# Patient Record
Sex: MALE | Race: WHITE | Employment: OTHER | URBAN - METROPOLITAN AREA
[De-identification: names, ages, dates, MRNs, and addresses within clinical notes are randomized per-mention and may not be internally consistent; named-entity substitution may affect disease eponyms.]

---

## 2019-10-12 ENCOUNTER — HOSPITAL ENCOUNTER (OUTPATIENT)
Facility: HOSPITAL | Age: 79
Setting detail: OBSERVATION
Discharge: HOME | End: 2019-10-15
Attending: EMERGENCY MEDICINE | Admitting: HOSPITALIST
Payer: MEDICARE

## 2019-10-12 ENCOUNTER — APPOINTMENT (OUTPATIENT)
Dept: ULTRASOUND IMAGING | Facility: HOSPITAL | Age: 79
End: 2019-10-12
Attending: HOSPITALIST
Payer: MEDICARE

## 2019-10-12 ENCOUNTER — APPOINTMENT (OUTPATIENT)
Dept: CT IMAGING | Facility: HOSPITAL | Age: 79
End: 2019-10-12
Attending: EMERGENCY MEDICINE
Payer: MEDICARE

## 2019-10-12 DIAGNOSIS — N39.0 URINARY TRACT INFECTION WITHOUT HEMATURIA, SITE UNSPECIFIED: Primary | ICD-10-CM

## 2019-10-12 PROCEDURE — 70498 CT ANGIOGRAPHY NECK: CPT | Performed by: EMERGENCY MEDICINE

## 2019-10-12 PROCEDURE — 99220 INITIAL OBSERVATION CARE,LEVL III: CPT | Performed by: HOSPITALIST

## 2019-10-12 PROCEDURE — 76770 US EXAM ABDO BACK WALL COMP: CPT | Performed by: HOSPITALIST

## 2019-10-12 PROCEDURE — 70496 CT ANGIOGRAPHY HEAD: CPT | Performed by: EMERGENCY MEDICINE

## 2019-10-12 RX ORDER — MORPHINE SULFATE 4 MG/ML
4 INJECTION, SOLUTION INTRAMUSCULAR; INTRAVENOUS ONCE
Status: COMPLETED | OUTPATIENT
Start: 2019-10-12 | End: 2019-10-12

## 2019-10-12 RX ORDER — PIOGLITAZONEHYDROCHLORIDE 30 MG/1
30 TABLET ORAL DAILY
Status: ON HOLD | COMMUNITY
End: 2019-10-12

## 2019-10-12 RX ORDER — ENOXAPARIN SODIUM 100 MG/ML
40 INJECTION SUBCUTANEOUS DAILY
Status: DISCONTINUED | OUTPATIENT
Start: 2019-10-12 | End: 2019-10-15

## 2019-10-12 RX ORDER — ACETAMINOPHEN 325 MG/1
650 TABLET ORAL EVERY 6 HOURS PRN
Status: DISCONTINUED | OUTPATIENT
Start: 2019-10-12 | End: 2019-10-15

## 2019-10-12 RX ORDER — ATORVASTATIN CALCIUM 40 MG/1
40 TABLET, FILM COATED ORAL DAILY
COMMUNITY

## 2019-10-12 RX ORDER — GEMFIBROZIL 600 MG/1
600 TABLET, FILM COATED ORAL
COMMUNITY
End: 2021-07-07

## 2019-10-12 RX ORDER — PERPHENAZINE/AMITRIPTYLINE HCL 2 MG-25 MG
TABLET ORAL DAILY
Status: ON HOLD | COMMUNITY
End: 2021-07-13

## 2019-10-12 RX ORDER — SODIUM CHLORIDE 9 MG/ML
INJECTION, SOLUTION INTRAVENOUS CONTINUOUS
Status: DISCONTINUED | OUTPATIENT
Start: 2019-10-12 | End: 2019-10-15

## 2019-10-12 RX ORDER — DEXTROSE MONOHYDRATE 25 G/50ML
50 INJECTION, SOLUTION INTRAVENOUS
Status: DISCONTINUED | OUTPATIENT
Start: 2019-10-12 | End: 2019-10-15

## 2019-10-12 RX ORDER — PIOGLITAZONEHYDROCHLORIDE 30 MG/1
30 TABLET ORAL DAILY
Status: ON HOLD | COMMUNITY
End: 2021-07-13

## 2019-10-12 NOTE — ED NOTES
Wife at bedside, states patient has had a recent onset of confusion that began tonight and had difficulty speaking, patient denies falling or hitting of the head.  NIH score 0

## 2019-10-12 NOTE — H&P
[facility]  History and Physical     Bernabe Solitario Patient Status:  Emergency    1940 MRN [id number]   Location [address] [facility] Attending Hilda Sanchez, [address] [place] [address] Se Day # 0 PCP Zulay AU     Chief Comp bilaterally. No rhonchi. Cardiovascular: S1, S2. Regular rhythm. Regular rate. No murmurs, rubs or gallops. Equal pulses. Chest and Back: No tenderness or deformity. Abdomen: Soft, nontender, nondistended. Positive bowel sounds.  No rebound or guarding

## 2019-10-12 NOTE — ED NOTES
21 G IV inserted to the left AC, labs drawn and urine collected, patient appears minimally anxious however cooperative, even bilateral non-labored breathing. Patient taken to CT via cart.

## 2019-10-12 NOTE — ED PROVIDER NOTES
Patient Seen in: [facility] Emergency Department      History   Patient presents with:  Urinary Symptoms (urologic)    Stated Complaint: DIFFICULTY URINATING    HPI    Patient is an 50-year-old male presents to ED for evaluation of urinary symptoms. membranes. Pupils equal round reactive to light accommodation, extraocular motion is intact, sclerae white, conjunctiva is pink. Oropharynx is unremarkable, no exudate. NECK: Supple, trachea midline, no lymphadenopathy.    LUNG: Lungs clear to auscultati -----------         ------                     CBC W/ DIFFERENTIAL[[id number]]          Abnormal            Final result                 Please view results for these tests on the individual orders.    RAINBOW DRAW BLUE   RAINBOW DRAW GOLD   URINE

## 2019-10-12 NOTE — ED NOTES
Patient reports dysuria that began tonight, complains of suprapubic pain, rated 4/10, denies ABD pain, -N/V/D. Patient states he has frequent UTI's and has an appointment with a nephrologist in November.

## 2019-10-12 NOTE — PLAN OF CARE
Temp max today was 103F, pt was diaphoretic and with required O2 2L per NC to maintain sats above 90%. MD notified, Tylenol given, blood cultures sent. Pt feeling much better now, moderate appetite, afebrile. IV fluids infusion in progress.

## 2019-10-12 NOTE — ED NOTES
Home medications charted on, unable to add jardiance 25mg daily, victoza 1.8mg daily, and perphenazine-amitriptyline 2-25mg daily. Recommend CCD 2 and 2 gm Na+ diet when safely advanced to solids  Check Ionized Ca

## 2019-10-12 NOTE — ED NOTES
Patient returned from CT, awaiting results. Patient desaturated to 89%, patient placed on 3L NC. Currently saturating at 96%.

## 2019-10-13 PROCEDURE — 99225 SUBSEQUENT OBSERVATION CARE: CPT | Performed by: HOSPITALIST

## 2019-10-13 NOTE — PROGRESS NOTES
[facility]  Progress Note     Debora Mas Patient Status:  Observation    1940 MRN [id number]   [facility] 5NW-A Attending João Rodriguez MD   Hosp Day # 0 PCP Haim AU     Chief Complaint: AMS, UTI    S: Bethany Perez macrobid  1. Renal US negative for stones but showing enlarged prostate  2. Follow final Urine Cx results  3. Continue empiric Antibiotics  2. DM II- hold oral meds and hyperglycemic protocol   3.  Leukocytosis 2/2 above        Plan of care: await final Cx

## 2019-10-13 NOTE — PLAN OF CARE
Pt AOX4. Impulsive. Glasses. [address] @ home. VSS on RA. No tele. Lovenox SQ. E.p. voids. Urgency. No c.o pain. Up with SBA. Fall precautions in place. Iv abx. Ivf. 1800 ada diet, QID accu checks. Pt and wife updated on poc, wctm.        Problem: Patient/Family Progressing  10/13/2019 1106 by Heraclio Woods RN  Outcome: Progressing

## 2019-10-13 NOTE — PROGRESS NOTES
10/13/19 1143   Vital Signs   Temp (!) 100.6 °F (38.1 °C)   Temp src Oral   Pulse 79   Heart Rate Source Monitor   Resp 18   Respiratory Quality Normal   BP Location Right arm   BP Method Automatic   Patient Position Lying   Oxygen Therapy   SpO2 90 %

## 2019-10-13 NOTE — PLAN OF CARE
Problem: Patient/Family Goals  Goal: Patient/Family Long Term Goal  Description  Patient's Long Term Goal: D/C HOME    Interventions:  - FOLLOW PLAN OF CARE  - See additional Care Plan goals for specific interventions   Outcome: Progressing     Problem:

## 2019-10-14 PROCEDURE — 99225 SUBSEQUENT OBSERVATION CARE: CPT | Performed by: HOSPITALIST

## 2019-10-14 NOTE — CM/SW NOTE
10/14/19 1300   CM/SW Screening   Referral Source    Information Source Chart review;Nursing rounds   Patient's Mental Status Alert;Oriented   Patient's [address]   Number of Levels in Home 1   Patient lives with Spouse   Shital

## 2019-10-14 NOTE — PLAN OF CARE
Assumed pt care this morning. Aa/ox4. Breathing unlabored. Denies pain. Tolerating po intake. Ambulating to bathroom. Awaiting medical clearance for d/c home.

## 2019-10-14 NOTE — PLAN OF CARE
PT A/O, IMPULSIVE, GETS OUT OF BED WITHOUT WAITING FOR STAFF, URGENCY/FREQUENCY, INCONTINENT OF URINE AT TIMES, BED ALARM ON, TEMP 101.8, GIVEN TYLENOL, DESTATS WITH SLEEP TO LOW 80'S, 02 AT 4L FOR NIGHT, IVF INFUSING, IV ANTIBIOTIC.  INSTRUCTED PT TO CALL

## 2019-10-14 NOTE — PROGRESS NOTES
[facility]  Progress Note     Gerri Kathi Patient Status:  Observation    1940 MRN [id number]   [facility] 5NW-A Attending Temo Godinez MD   Hosp Day # 0 PCP Edil AU     Chief Complaint: confusion    S: Patie on iv hydration    Plan of care: abx    Quality:  · DVT Prophylaxis: scd  · CODE status: full  · Ga: no  · Central line: no    Will the patient be referred to TCC on discharge?: no  Estimated date of discharge: am  Discharge is dependent on: progress  A

## 2019-10-15 VITALS
OXYGEN SATURATION: 95 % | BODY MASS INDEX: 28.12 KG/M2 | RESPIRATION RATE: 20 BRPM | WEIGHT: 196.38 LBS | HEIGHT: 70 IN | HEART RATE: 72 BPM | TEMPERATURE: 98 F | SYSTOLIC BLOOD PRESSURE: 123 MMHG | DIASTOLIC BLOOD PRESSURE: 61 MMHG

## 2019-10-15 PROCEDURE — 99217 OBSERVATION CARE DISCHARGE: CPT | Performed by: HOSPITALIST

## 2019-10-15 RX ORDER — CIPROFLOXACIN 500 MG/1
500 TABLET, FILM COATED ORAL 2 TIMES DAILY
Qty: 14 TABLET | Refills: 0 | Status: SHIPPED | OUTPATIENT
Start: 2019-10-15 | End: 2019-10-22

## 2019-10-15 NOTE — PLAN OF CARE
Problem: Patient/Family Goals  Goal: Patient/Family Long Term Goal  Description  Patient's Long Term Goal: D/C HOME    Interventions:  - FOLLOW PLAN OF CARE  - See additional Care Plan goals for specific interventions   Outcome: Progressing  Goal: Pat[place]na

## 2019-10-15 NOTE — DISCHARGE SUMMARY
[facility] [place] [facility]PITAL[facility]  DISCHARGE SUMMARY     Ricci Eaton Patient Status:  Observation    1940 MRN [id number]   [facility] 5NW-A Attending Henry Chery MD   Hosp Day # 0 PCP Jamaal AU     Date of Admission: 10/12/2019  Keven Liraglutide      Inject 1.8 mg into the skin. Refills:  0            [place] Follow-up appointment:   No follow-up provider specified.   Appointments for Next 30 Days 10/15/2019 - 11/14/2019    None          Vital signs:  Temp:  [97.8 °F (36.6

## 2019-10-15 NOTE — PLAN OF CARE
Multidisciplinary Discharge Rounds held 10/15/2019. Treatment team members present today include , , Charge Nurse, Nurse, RT, PT and Pharmacy caring for [employer].      Other care providers present: Jolynn Angeles RN    Mobility Goal:

## 2019-10-15 NOTE — PLAN OF CARE
NURSING DISCHARGE NOTE    Discharged Home via Ambulatory. Accompanied by Spouse and Support staff  Belongings Taken by patient/family. PIV removed. Discharge navigator complete. Discharge instructions reviewed with patient & family at bedside.  All qu

## 2021-01-06 ENCOUNTER — IMPORTED ENCOUNTER (OUTPATIENT)
Dept: URBAN - METROPOLITAN AREA CLINIC 31 | Facility: CLINIC | Age: 81
End: 2021-01-06

## 2021-01-06 PROBLEM — H26.493: Noted: 2021-01-06

## 2021-01-06 PROBLEM — H18.513: Noted: 2021-01-06

## 2021-01-06 PROBLEM — Z96.1: Noted: 2021-01-06

## 2021-01-06 PROBLEM — H40.1422: Noted: 2021-01-06

## 2021-01-06 PROCEDURE — 99204 OFFICE O/P NEW MOD 45 MIN: CPT

## 2021-01-06 NOTE — PATIENT DISCUSSION
1.  1.  Pseudophakia OU - IOLs stable. Monitor for changes in vision. 2. PCO  OU (Posterior Capsule Opacification)   PCO is visually significant and impairment of vision does not meet the patient’s functional needs or interferes with activities of daily living. Risks benefits and alternatives to the Nd:YAG Laser reviewed including elevated IOP immediately postop and retinal tear/detachment. Patient to notify their ophthalmologist promptly if they have a significant change in symptoms such as flashes of light (photopsia) an increase in floaters loss of visual field or decrease in visual acuity after the procedure. Patient will be scheduled in [place] [address] for Nd:YAG Laser. USE LENS for YAG OS to minimize energy. Possibility of IOL dislocation due to PXE discussedPSeudophakodenesisUSE MINIMAL ENERGY FOR OSOD/OS. Dr Call Push to comanage post op3. PXE OS -  Continue with current treatment plan. Discussed importance of compliance. Will continue to monitor for stability or progression. 4. Fuchs Dystrophy OU: Recommend clinical observation. Advised use of Alice 128 drops in affected eye if worsening.

## 2021-01-06 NOTE — PATIENT DISCUSSION
PCO  OU (Posterior Capsule Opacification)   PCO is visually significant and impairment of vision does not meet the patient’s functional needs or interferes with activities of daily living. Risks benefits and alternatives to the Nd:YAG Laser reviewed including elevated IOP immediately postop and retinal tear/detachment. Patient to notify their ophthalmologist promptly if they have a significant change in symptoms such as flashes of light (photopsia) an increase in floaters loss of visual field or decrease in visual acuity after the procedure. Patient will be scheduled in [place] [address] for Nd:YAG Laser.

## 2021-06-01 ENCOUNTER — APPOINTMENT (OUTPATIENT)
Dept: ULTRASOUND IMAGING | Facility: HOSPITAL | Age: 81
End: 2021-06-01
Attending: EMERGENCY MEDICINE
Payer: MEDICARE

## 2021-06-01 ENCOUNTER — HOSPITAL ENCOUNTER (EMERGENCY)
Facility: HOSPITAL | Age: 81
Discharge: HOME | End: 2021-06-01
Attending: EMERGENCY MEDICINE
Payer: MEDICARE

## 2021-06-01 ENCOUNTER — APPOINTMENT (OUTPATIENT)
Dept: GENERAL RADIOLOGY | Facility: HOSPITAL | Age: 81
End: 2021-06-01
Payer: MEDICARE

## 2021-06-01 ENCOUNTER — APPOINTMENT (OUTPATIENT)
Dept: GENERAL RADIOLOGY | Facility: HOSPITAL | Age: 81
End: 2021-06-01
Attending: EMERGENCY MEDICINE
Payer: MEDICARE

## 2021-06-01 VITALS
RESPIRATION RATE: 17 BRPM | SYSTOLIC BLOOD PRESSURE: 120 MMHG | HEART RATE: 93 BPM | OXYGEN SATURATION: 100 % | WEIGHT: 198.44 LBS | TEMPERATURE: 98 F | DIASTOLIC BLOOD PRESSURE: 97 MMHG | BODY MASS INDEX: 28 KG/M2

## 2021-06-01 DIAGNOSIS — S43.015A ANTERIOR DISLOCATION OF LEFT SHOULDER, INITIAL ENCOUNTER: Primary | ICD-10-CM

## 2021-06-01 PROCEDURE — 73030 X-RAY EXAM OF SHOULDER: CPT | Performed by: EMERGENCY MEDICINE

## 2021-06-01 PROCEDURE — 23650 CLTX SHO DSLC W/MNPJ WO ANES: CPT

## 2021-06-01 PROCEDURE — 96374 THER/PROPH/DIAG INJ IV PUSH: CPT

## 2021-06-01 PROCEDURE — 99285 EMERGENCY DEPT VISIT HI MDM: CPT

## 2021-06-01 PROCEDURE — 93005 ELECTROCARDIOGRAM TRACING: CPT

## 2021-06-01 PROCEDURE — 93971 EXTREMITY STUDY: CPT | Performed by: EMERGENCY MEDICINE

## 2021-06-01 PROCEDURE — 96375 TX/PRO/DX INJ NEW DRUG ADDON: CPT

## 2021-06-01 PROCEDURE — 93010 ELECTROCARDIOGRAM REPORT: CPT

## 2021-06-01 RX ORDER — MORPHINE SULFATE 4 MG/ML
2 INJECTION, SOLUTION INTRAMUSCULAR; INTRAVENOUS ONCE
Status: COMPLETED | OUTPATIENT
Start: 2021-06-01 | End: 2021-06-01

## 2021-06-01 NOTE — ED PROVIDER NOTES
Patient Seen in: [facility] Emergency Department      History   Patient presents with:  Arm or Hand Injury    Stated Complaint: Fall few days ago, left shoulder injury and reports not getting any better     HPI/Subjective:   HPI    67-year-old male °C) (Temporal)   Resp 17   Wt 90 kg   SpO2 92%   BMI 28.47 kg/m²         Physical Exam    General:  Vitals as listed. No acute distress   HEENT: Sclerae anicteric. Conjunctivae show no pallor.   Oropharynx clear, mucous membranes moist   Neck: supple, no (MIN 2 VIEWS), LEFT (CPT=73030)     TECHNIQUE:  Multiple views were obtained. COMPARISON:  None.   INDICATIONS:  Fall few days ago, left shoulder injury and reports not getting any better  PATIENT STATED HISTORY: (As transcribed by Technologist)  Patient f secondary to patient factors. Segments of venous anatomy could not be visualized. Of the portions visualized, no DVT demonstrated.     Dictated by (CST): Becka Dominguez MD on 6/01/2021 at 4:39 PM     Finalized by (CST): Becka Dominguez MD on 6/01/2021 at 4:41 PM want them abducting the extremity. I believe that his edema is more dependent edema as this arm is been in a sling and relatively motionless for the past week. He states his pain is minimal currently and he just wants to take OTC Tylenol at home.   He has

## 2021-06-01 NOTE — ED INITIAL ASSESSMENT (HPI)
Per patient, tripped and fell 4 days ago and injured left shoulder. Has not been seen by MD for this. Denies hitting head or loss of consciousness. Only here for left shoulder pain. Patient arrives in sling. AAO x 4. Walks with steady gait and using cane.

## 2021-06-02 ENCOUNTER — APPOINTMENT (OUTPATIENT)
Dept: ULTRASOUND IMAGING | Age: 81
End: 2021-06-02
Attending: EMERGENCY MEDICINE
Payer: MEDICARE

## 2021-06-02 ENCOUNTER — HOSPITAL ENCOUNTER (EMERGENCY)
Age: 81
Discharge: HOME | End: 2021-06-02
Attending: EMERGENCY MEDICINE
Payer: MEDICARE

## 2021-06-02 ENCOUNTER — TELEPHONE (OUTPATIENT)
Dept: CASE MANAGEMENT | Facility: HOSPITAL | Age: 81
End: 2021-06-02

## 2021-06-02 ENCOUNTER — TELEPHONE (OUTPATIENT)
Dept: ORTHOPEDICS CLINIC | Facility: CLINIC | Age: 81
End: 2021-06-02

## 2021-06-02 VITALS
HEIGHT: 70 IN | TEMPERATURE: 98 F | OXYGEN SATURATION: 96 % | WEIGHT: 207 LBS | RESPIRATION RATE: 18 BRPM | BODY MASS INDEX: 29.63 KG/M2 | SYSTOLIC BLOOD PRESSURE: 125 MMHG | DIASTOLIC BLOOD PRESSURE: 77 MMHG | HEART RATE: 94 BPM

## 2021-06-02 DIAGNOSIS — T14.8XXA ABRASION: ICD-10-CM

## 2021-06-02 DIAGNOSIS — R58 ECCHYMOSIS: ICD-10-CM

## 2021-06-02 DIAGNOSIS — R22.9 SOFT TISSUE SWELLING: Primary | ICD-10-CM

## 2021-06-02 PROCEDURE — 93971 EXTREMITY STUDY: CPT | Performed by: EMERGENCY MEDICINE

## 2021-06-02 PROCEDURE — 99284 EMERGENCY DEPT VISIT MOD MDM: CPT

## 2021-06-02 NOTE — TELEPHONE ENCOUNTER
Patient is requesting an appointment with Dr. Jaclyn Bell as soon as possible for a dislocated left shoulder. Should patient be double booked?

## 2021-06-02 NOTE — TELEPHONE ENCOUNTER
Spoke with patient's wife, and notified them that pt can be seen next week, per Dr. Kurtis Frazier. Pt's wife stated they will be out of town Tuesday and Wednesday, and they had concerns because the arm is now having weeping edema from the elbow down.  Dr. Kurtis Frazier not

## 2021-06-02 NOTE — ED PROVIDER NOTES
Patient Seen in: THE [facility] Emergency Department In [place]      History   Patient presents with:  Swelling Edema  Bleeding    Stated Complaint: swelling and bleeding to lt arm after dislocated yesterday    HPI/Subjective:   HPI    Presents with swelling rales/rhonchi. Cardiac: No tachycardia  Abdomen: Soft and nontender throughout. No rebound or guarding  Extremities: There is no dislocation. There is ecchymosis to the upper arm extending past the elbow. It is soft. Strong pulses distally.   Neurovasc

## 2021-06-02 NOTE — TELEPHONE ENCOUNTER
Herve CASTRO  called wanting to know if patient can be seen sooner. She stated Dr. Marcella Tamez was on call and was hoping that he can be seen by him. I let her know that patient called requesting an appointment with Dr. Kurtis Frazier.  Please contact patient

## 2021-06-02 NOTE — ED INITIAL ASSESSMENT (HPI)
Pt was in THE [facility] ED yesterday with shoulder dislocation, pt was in shoulder immobilizer and presents today with swelling and bleeding to the left arm where the immobilizer was placed.

## 2021-06-04 ENCOUNTER — OFFICE VISIT (OUTPATIENT)
Dept: ORTHOPEDICS CLINIC | Facility: CLINIC | Age: 81
End: 2021-06-04
Payer: MEDICARE

## 2021-06-04 DIAGNOSIS — M25.312 INSTABILITY OF LEFT SHOULDER JOINT: Primary | ICD-10-CM

## 2021-06-04 PROCEDURE — 99203 OFFICE O/P NEW LOW 30 MIN: CPT | Performed by: ORTHOPAEDIC SURGERY

## 2021-06-04 PROCEDURE — 23650 CLTX SHO DSLC W/MNPJ WO ANES: CPT | Performed by: ORTHOPAEDIC SURGERY

## 2021-06-08 NOTE — H&P
[facility] - ORTHOPEDICS  [facility] [address] [id number] [phone]     NEW PATIENT VISIT - HISTORY AND PHYSICAL EXAMINATION     Name: Valentina Christensen   MRN: [id number]  Date: 6/4/2021     CC: Left should daily.     • Liraglutide (VICTOZA) 18 MG/3ML Subcutaneous Solution Pen-injector Inject 1.8 mg into the skin.  (Patient not taking: Reported on 6/1/2021 )         ROS: A comprehensive 14 point review of systems was performed and was negative aside from the a shoulder is deferred at this time. Radiographic Examination/Diagnostics:    Radiographs left shoulder personally viewed, independently interpreted and radiology report was reviewed.     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     Dictated by (CST): Hari Joiner MD on 6/01/2021 at 1:17 PM     Finalized by (CST): Hari Joiner MD on 6/01/2021 at 1:18 PM       [facility] (HPW=[id number])    Result Date: 6/2/2021  PROCEDURE:  US VE HISTORY: (As transcribed by Technologist)  Patient offered no additional history at this time     FINDINGS:  The technologist reports a technically limited examination secondary to the patient's inability to move his arm.   The brachial, cephalic and basili the left shoulder. Marylu Wild. Alem Burdick MD  Knee, Shoulder, & Elbow Surgery / Sports Medicine Specialist  [facility]  [facility] [address], [address] [facility]. Sherry Townsend. [email or web address]  t: [phone]  o: [phone]

## 2021-06-11 ENCOUNTER — TELEPHONE (OUTPATIENT)
Dept: ORTHOPEDICS CLINIC | Facility: CLINIC | Age: 81
End: 2021-06-11

## 2021-06-11 NOTE — TELEPHONE ENCOUNTER
Spoke to patients wife who states that patient is in a lot of pain. Unable to sleep at night. Patient was seen last week. Two weeks since injury. Forearm is very sensitive. Swelling has improved. Wife states he is only sleeping 2 hours a day.  Patient woul

## 2021-06-11 NOTE — TELEPHONE ENCOUNTER
Patient spouse is calling on behalf of patient in regards to patient being in a lot of pain due to shoulder dislocation. Patient has been taking tylenol but doesn't seem to help, unable to sleep due to pain.

## 2021-06-12 RX ORDER — TRAMADOL HYDROCHLORIDE 50 MG/1
TABLET ORAL
Qty: 20 TABLET | Refills: 1 | Status: ON HOLD | OUTPATIENT
Start: 2021-06-12 | End: 2021-07-13

## 2021-06-14 NOTE — TELEPHONE ENCOUNTER
Patients wife informed of MD recommendations below, verbalized understanding with intent to comply. Wife states that they were able to  medication over the weekend.

## 2021-06-18 ENCOUNTER — HOSPITAL ENCOUNTER (OUTPATIENT)
Dept: GENERAL RADIOLOGY | Age: 81
Discharge: HOME | End: 2021-06-18
Attending: ORTHOPAEDIC SURGERY
Payer: MEDICARE

## 2021-06-18 ENCOUNTER — OFFICE VISIT (OUTPATIENT)
Dept: ORTHOPEDICS CLINIC | Facility: CLINIC | Age: 81
End: 2021-06-18
Payer: MEDICARE

## 2021-06-18 DIAGNOSIS — M25.312 INSTABILITY OF LEFT SHOULDER JOINT: ICD-10-CM

## 2021-06-18 DIAGNOSIS — M25.312 INSTABILITY OF LEFT SHOULDER JOINT: Primary | ICD-10-CM

## 2021-06-18 PROCEDURE — 99024 POSTOP FOLLOW-UP VISIT: CPT | Performed by: ORTHOPAEDIC SURGERY

## 2021-06-18 PROCEDURE — 73030 X-RAY EXAM OF SHOULDER: CPT | Performed by: ORTHOPAEDIC SURGERY

## 2021-06-20 NOTE — PROGRESS NOTES
[facility]  [address] [place] [phone]       Name: Roel Goodman   MRN: [id number]  Date: 6/18/2021     REASON FOR VISIT: Follow-up evaluation of left shoulder after prolong well as internal rotation. Examination of the left forearm reveals well-healed cutaneous abnormality without of drainage. Overall similar in appearance to the contralateral side. Sensory and motor examination of the hand is within normal limits. (MIN 2 VIEWS), LEFT (CPT=73030), 6/01/2021, 12:59 PM.  INDICATIONS:  Fall few days ago, left shoulder injury and reports not getting any better  PATIENT STATED HISTORY: (As transcribed by Technologist)  left shoulder post reduction.     FINDINGS:  There has TECHNIQUE:  Real time, grey scale, and duplex ultrasound was used to evaluate the upper extremity venous system. B-mode two-dimensional images of the vascular structures, Doppler spectral analysis, and color flow. Doppler imaging were performed.   The foll secondary to patient factors. Segments of venous anatomy could not be visualized. Of the portions visualized, no DVT demonstrated.     Dictated by (CST): Pandora Castleman, MD on 6/01/2021 at 4:39 PM     Finalized by (CST): Pandora Castleman, MD on 6/01/2021 at 4:41 PM

## 2021-06-25 ENCOUNTER — TELEPHONE (OUTPATIENT)
Dept: ORTHOPEDICS CLINIC | Facility: CLINIC | Age: 81
End: 2021-06-25

## 2021-06-25 NOTE — TELEPHONE ENCOUNTER
Patient's wife called requesting PT order be faxed to [facility] at [address] [facility]. Faxed to [phone]. Fax confirmation received.

## 2021-06-30 ENCOUNTER — HOSPITAL ENCOUNTER (OUTPATIENT)
Dept: MRI IMAGING | Facility: HOSPITAL | Age: 81
Discharge: HOME | End: 2021-06-30
Attending: ORTHOPAEDIC SURGERY
Payer: MEDICARE

## 2021-06-30 DIAGNOSIS — M25.312 INSTABILITY OF LEFT SHOULDER JOINT: ICD-10-CM

## 2021-06-30 PROCEDURE — 73221 MRI JOINT UPR EXTREM W/O DYE: CPT | Performed by: ORTHOPAEDIC SURGERY

## 2021-07-02 ENCOUNTER — TELEPHONE (OUTPATIENT)
Dept: ORTHOPEDICS CLINIC | Facility: CLINIC | Age: 81
End: 2021-07-02

## 2021-07-02 ENCOUNTER — OFFICE VISIT (OUTPATIENT)
Dept: ORTHOPEDICS CLINIC | Facility: CLINIC | Age: 81
End: 2021-07-02
Payer: MEDICARE

## 2021-07-02 DIAGNOSIS — M12.812 ROTATOR CUFF TEAR ARTHROPATHY OF LEFT SHOULDER: Primary | ICD-10-CM

## 2021-07-02 DIAGNOSIS — M75.102 ROTATOR CUFF TEAR ARTHROPATHY, LEFT: Primary | ICD-10-CM

## 2021-07-02 DIAGNOSIS — M12.812 ROTATOR CUFF TEAR ARTHROPATHY, LEFT: Primary | ICD-10-CM

## 2021-07-02 DIAGNOSIS — M75.102 ROTATOR CUFF TEAR ARTHROPATHY OF LEFT SHOULDER: Primary | ICD-10-CM

## 2021-07-02 PROCEDURE — 99024 POSTOP FOLLOW-UP VISIT: CPT | Performed by: ORTHOPAEDIC SURGERY

## 2021-07-02 NOTE — TELEPHONE ENCOUNTER
[facility] SHOULDER  Name: Freddy Lynch  MRN: [id number]   : 1940  Diagnosis:  [x]? Rotator cuff tear arthropathy of left shoulder [M75.102, M12.812]  Disposition:    []? Ambulatory  []? Overnight for MORGAN  [x]?  Overnight for observation an

## 2021-07-02 NOTE — H&P (VIEW-ONLY)
[facility] - ORTHOPEDICS  [facility] [address]  [phone]     HISTORY AND PHYSICAL EXAMINATION     Name: Anna Estrada   MRN: [id number]  Date: 7/2/2021     CC: Left shoulder pain and pseudopa illness. SOCIAL HX:  Social History    Tobacco Use      Smoking status: Never Smoker      Smokeless tobacco: Never Used    Alcohol use: Not Currently    His granddaughter is getting  on July 31    PE: There were no vitals filed for this visit. pulse    The contralateral upper extremity is without limitation in range of motion or strength, no positive provocative maneuvers.      Radiographic Examination/Diagnostics:    X-rays and MRI of the left shoulder personally viewed, independently interprete Left shoulder vs gas station door. There is very limited range of motion and mild pain. FINDINGS:  ROTATOR CUFF:  There is a massive rotator cuff tear which involves the entirety of the supraspinatus, infraspinatus and subscapularis tendons.  MUSCULATURE time, grey scale, and duplex ultrasound was used to evaluate the upper extremity venous system. B-mode two-dimensional images of the vascular structures, Doppler spectral analysis, and color flow. Doppler imaging were performed.   The following veins were risks that include, but are not limited to infection, blood loss, potential transient or permanent injury to nerves or blood vessels, joint stiffness, persistent pain, need for future operation, failure of healing, wound complications, failure of therapeut

## 2021-07-02 NOTE — TELEPHONE ENCOUNTER
Surgeon: Dr. Heath Hurtado     Date of Surgery: 7/13/21  Time: to follow     Post Op: 7/20/21     Facility: [facility]     Is this work comp related?  No     Surgical Assistant Obtained: Hernán Moseley Requested      Preadmission Testing Ordered: Yes, MRSA/MSSA To Be Judy Fiore

## 2021-07-02 NOTE — TELEPHONE ENCOUNTER
Vernon Sly's case has been scheduled/rescheduled at [phone] on 7/13/2021 at [facility]  Received:  Today  JANET Hooks  Patient Name: Jones Lovett    MRN: [id number]     LEFT REVERSE SHOULDER ARTHROPLASTY.         Anesth

## 2021-07-02 NOTE — PROGRESS NOTES
[facility] BOOKING SHEET SHOULDER  Name: Bernabe Solitario  MRN: [id number]   : 1940  Diagnosis:  [x] Rotator cuff tear arthropathy of left shoulder [M75.102, M12.812]  Disposition:    [] Ambulatory  [] Overnight for MORGAN  [x] Overnight for observation and pa

## 2021-07-02 NOTE — H&P
[facility] - ORTHOPEDICS  [place] [address]  [phone]     HISTORY AND PHYSICAL EXAMINATION     Name: Barbara Alonzo   MRN: [id number]  Date: 7/2/2021     CC: Left shoulder pain and pseudopa illness. SOCIAL HX:  Social History    Tobacco Use      Smoking status: Never Smoker      Smokeless tobacco: Never Used    Alcohol use: Not Currently    His granddaughter is getting  on July 31    PE: There were no vitals filed for this visit. pulse    The contralateral upper extremity is without limitation in range of motion or strength, no positive provocative maneuvers.      Radiographic Examination/Diagnostics:    X-rays and MRI of the left shoulder personally viewed, independently interprete Left shoulder vs gas station door. There is very limited range of motion and mild pain. FINDINGS:  ROTATOR CUFF:  There is a massive rotator cuff tear which involves the entirety of the supraspinatus, infraspinatus and subscapularis tendons.  MUSCULATURE time, grey scale, and duplex ultrasound was used to evaluate the upper extremity venous system. B-mode two-dimensional images of the vascular structures, Doppler spectral analysis, and color flow. Doppler imaging were performed.   The following veins were risks that include, but are not limited to infection, blood loss, potential transient or permanent injury to nerves or blood vessels, joint stiffness, persistent pain, need for future operation, failure of healing, wound complications, failure of therapeut

## 2021-07-06 ENCOUNTER — LABORATORY ENCOUNTER (OUTPATIENT)
Dept: LAB | Age: 81
End: 2021-07-06
Payer: MEDICARE

## 2021-07-06 DIAGNOSIS — M12.812 ROTATOR CUFF TEAR ARTHROPATHY, LEFT: ICD-10-CM

## 2021-07-06 DIAGNOSIS — M75.102 ROTATOR CUFF TEAR ARTHROPATHY, LEFT: ICD-10-CM

## 2021-07-06 LAB
ANION GAP SERPL CALC-SCNC: 2 MMOL/L (ref 0–18)
ANTIBODY SCREEN: NEGATIVE
BUN BLD-MCNC: 21 MG/DL (ref 7–18)
BUN/CREAT SERPL: 26.9 (ref 10–20)
CALCIUM BLD-MCNC: 9.2 MG/DL (ref 8.5–10.1)
CHLORIDE SERPL-SCNC: 105 MMOL/L (ref 98–112)
CO2 SERPL-SCNC: 29 MMOL/L (ref 21–32)
CREAT BLD-MCNC: 0.78 MG/DL
DEPRECATED RDW RBC AUTO: 49.4 FL (ref 35.1–46.3)
ERYTHROCYTE [DISTWIDTH] IN BLOOD BY AUTOMATED COUNT: 14.1 % (ref 11–15)
GLUCOSE BLD-MCNC: 170 MG/DL (ref 70–99)
HCT VFR BLD AUTO: 47.6 %
HGB BLD-MCNC: 14.7 G/DL
MCH RBC QN AUTO: 29.9 PG (ref 26–34)
MCHC RBC AUTO-ENTMCNC: 30.9 G/DL (ref 31–37)
MCV RBC AUTO: 96.7 FL
OSMOLALITY SERPL CALC.SUM OF ELEC: 289 MOSM/KG (ref 275–295)
PLATELET # BLD AUTO: 216 10(3)UL (ref 150–450)
POTASSIUM SERPL-SCNC: 4.7 MMOL/L (ref 3.5–5.1)
RBC # BLD AUTO: 4.92 X10(6)UL
RH BLOOD TYPE: POSITIVE
SODIUM SERPL-SCNC: 136 MMOL/L (ref 136–145)
WBC # BLD AUTO: 5.6 X10(3) UL (ref 4–11)

## 2021-07-06 PROCEDURE — 87081 CULTURE SCREEN ONLY: CPT

## 2021-07-06 PROCEDURE — 36415 COLL VENOUS BLD VENIPUNCTURE: CPT

## 2021-07-06 PROCEDURE — 86900 BLOOD TYPING SEROLOGIC ABO: CPT

## 2021-07-06 PROCEDURE — 86850 RBC ANTIBODY SCREEN: CPT

## 2021-07-06 PROCEDURE — 80048 BASIC METABOLIC PNL TOTAL CA: CPT

## 2021-07-06 PROCEDURE — 85027 COMPLETE CBC AUTOMATED: CPT

## 2021-07-06 PROCEDURE — 86901 BLOOD TYPING SEROLOGIC RH(D): CPT

## 2021-07-07 ENCOUNTER — HOSPITAL ENCOUNTER (OUTPATIENT)
Dept: CT IMAGING | Age: 81
Discharge: HOME | End: 2021-07-07
Attending: ORTHOPAEDIC SURGERY
Payer: MEDICARE

## 2021-07-07 DIAGNOSIS — M75.102 ROTATOR CUFF TEAR ARTHROPATHY OF LEFT SHOULDER: ICD-10-CM

## 2021-07-07 DIAGNOSIS — M12.812 ROTATOR CUFF TEAR ARTHROPATHY OF LEFT SHOULDER: ICD-10-CM

## 2021-07-07 PROCEDURE — 73200 CT UPPER EXTREMITY W/O DYE: CPT | Performed by: ORTHOPAEDIC SURGERY

## 2021-07-07 RX ORDER — ACETAMINOPHEN 500 MG
1000 TABLET ORAL ONCE
Status: CANCELLED | OUTPATIENT
Start: 2021-07-07 | End: 2021-07-07

## 2021-07-09 ENCOUNTER — TELEPHONE (OUTPATIENT)
Dept: ORTHOPEDICS CLINIC | Facility: CLINIC | Age: 81
End: 2021-07-09

## 2021-07-09 NOTE — TELEPHONE ENCOUNTER
Spoke with patient's wife and notified her that outpatient therapy would be appropriate after her 's surgery, and it can start post op day #1. No further questions at this time.

## 2021-07-09 NOTE — TELEPHONE ENCOUNTER
Patient's spouse would like to speak to a nurse or Dr. Kaylen Leos in regards to having questions about upcoming surgery.

## 2021-07-09 NOTE — TELEPHONE ENCOUNTER
Attempted to call Kayden Phillips wife regarding questions before surgery. Reached voicemail, left voicemail and provided a detail message with my call back contact information.

## 2021-07-09 NOTE — TELEPHONE ENCOUNTER
Spoke with patient's wife who was asking whether PT would be prescribed for her  after his upcoming surgery, and if it would be home or outpatient therapy.  She wanted to let Dr. Mills Daily know that they have a therapy location in mind, [facility] Therapy i

## 2021-07-10 ENCOUNTER — LAB ENCOUNTER (OUTPATIENT)
Dept: LAB | Age: 81
DRG: 483 | End: 2021-07-10
Attending: ORTHOPAEDIC SURGERY
Payer: MEDICARE

## 2021-07-10 DIAGNOSIS — M12.812 ROTATOR CUFF TEAR ARTHROPATHY, LEFT: ICD-10-CM

## 2021-07-10 DIAGNOSIS — M75.102 ROTATOR CUFF TEAR ARTHROPATHY, LEFT: ICD-10-CM

## 2021-07-11 LAB — SARS-COV-2 RNA RESP QL NAA+PROBE: NOT DETECTED

## 2021-07-13 ENCOUNTER — ANESTHESIA EVENT (OUTPATIENT)
Dept: SURGERY | Facility: HOSPITAL | Age: 81
DRG: 483 | End: 2021-07-13
Payer: MEDICARE

## 2021-07-13 ENCOUNTER — APPOINTMENT (OUTPATIENT)
Dept: GENERAL RADIOLOGY | Facility: HOSPITAL | Age: 81
DRG: 483 | End: 2021-07-13
Attending: ORTHOPAEDIC SURGERY
Payer: MEDICARE

## 2021-07-13 ENCOUNTER — HOSPITAL ENCOUNTER (INPATIENT)
Facility: HOSPITAL | Age: 81
LOS: 2 days | Discharge: HOME HEALTH | DRG: 483 | End: 2021-07-15
Attending: ORTHOPAEDIC SURGERY | Admitting: ORTHOPAEDIC SURGERY
Payer: MEDICARE

## 2021-07-13 ENCOUNTER — ANESTHESIA (OUTPATIENT)
Dept: SURGERY | Facility: HOSPITAL | Age: 81
DRG: 483 | End: 2021-07-13
Payer: MEDICARE

## 2021-07-13 DIAGNOSIS — M75.102 ROTATOR CUFF TEAR ARTHROPATHY, LEFT: Primary | ICD-10-CM

## 2021-07-13 DIAGNOSIS — M12.812 ROTATOR CUFF TEAR ARTHROPATHY, LEFT: Primary | ICD-10-CM

## 2021-07-13 LAB
GLUCOSE BLD-MCNC: 129 MG/DL (ref 70–99)
GLUCOSE BLD-MCNC: 193 MG/DL (ref 70–99)
GLUCOSE BLD-MCNC: 204 MG/DL (ref 70–99)
GLUCOSE BLD-MCNC: 217 MG/DL (ref 70–99)

## 2021-07-13 PROCEDURE — 99223 1ST HOSP IP/OBS HIGH 75: CPT | Performed by: HOSPITALIST

## 2021-07-13 PROCEDURE — 0RRK00Z REPLACEMENT OF LEFT SHOULDER JOINT WITH REVERSE BALL AND SOCKET SYNTHETIC SUBSTITUTE, OPEN APPROACH: ICD-10-PCS | Performed by: ORTHOPAEDIC SURGERY

## 2021-07-13 PROCEDURE — 73020 X-RAY EXAM OF SHOULDER: CPT | Performed by: ORTHOPAEDIC SURGERY

## 2021-07-13 DEVICE — IMPLANTABLE DEVICE
Type: IMPLANTABLE DEVICE | Site: SHOULDER | Status: FUNCTIONAL
Brand: COMPREHENSIVE SHOULDER SYSTEM

## 2021-07-13 DEVICE — IMPLANTABLE DEVICE
Type: IMPLANTABLE DEVICE | Site: SHOULDER | Status: FUNCTIONAL
Brand: COMPREHENSIVE® REVERSE SHOULDER

## 2021-07-13 DEVICE — IMPLANTABLE DEVICE
Type: IMPLANTABLE DEVICE | Site: SHOULDER | Status: FUNCTIONAL
Brand: COMPREHENSIVE REVERSE SHOULDER

## 2021-07-13 RX ORDER — SODIUM CHLORIDE, SODIUM LACTATE, POTASSIUM CHLORIDE, CALCIUM CHLORIDE 600; 310; 30; 20 MG/100ML; MG/100ML; MG/100ML; MG/100ML
INJECTION, SOLUTION INTRAVENOUS CONTINUOUS
Status: DISCONTINUED | OUTPATIENT
Start: 2021-07-13 | End: 2021-07-13 | Stop reason: HOSPADM

## 2021-07-13 RX ORDER — PIOGLITAZONE HCL 30 MG
30 TABLET ORAL DAILY
COMMUNITY

## 2021-07-13 RX ORDER — CEFAZOLIN SODIUM/WATER 2 G/20 ML
2 SYRINGE (ML) INTRAVENOUS EVERY 8 HOURS
Status: COMPLETED | OUTPATIENT
Start: 2021-07-13 | End: 2021-07-14

## 2021-07-13 RX ORDER — OXYCODONE HYDROCHLORIDE 5 MG/1
5 TABLET ORAL EVERY 4 HOURS PRN
Status: DISCONTINUED | OUTPATIENT
Start: 2021-07-13 | End: 2021-07-14

## 2021-07-13 RX ORDER — DEXTROSE MONOHYDRATE 25 G/50ML
50 INJECTION, SOLUTION INTRAVENOUS
Status: DISCONTINUED | OUTPATIENT
Start: 2021-07-13 | End: 2021-07-15

## 2021-07-13 RX ORDER — ATORVASTATIN CALCIUM 40 MG/1
40 TABLET, FILM COATED ORAL DAILY
Status: DISCONTINUED | OUTPATIENT
Start: 2021-07-14 | End: 2021-07-15

## 2021-07-13 RX ORDER — DIPHENHYDRAMINE HYDROCHLORIDE 50 MG/ML
25 INJECTION INTRAMUSCULAR; INTRAVENOUS ONCE AS NEEDED
Status: ACTIVE | OUTPATIENT
Start: 2021-07-13 | End: 2021-07-13

## 2021-07-13 RX ORDER — KETOROLAC TROMETHAMINE 15 MG/ML
15 INJECTION, SOLUTION INTRAMUSCULAR; INTRAVENOUS EVERY 6 HOURS
Status: DISPENSED | OUTPATIENT
Start: 2021-07-13 | End: 2021-07-14

## 2021-07-13 RX ORDER — POLYMYXIN B 500000 [USP'U]/1
INJECTION, POWDER, LYOPHILIZED, FOR SOLUTION INTRAMUSCULAR; INTRATHECAL; INTRAVENOUS; OPHTHALMIC AS NEEDED
Status: DISCONTINUED | OUTPATIENT
Start: 2021-07-13 | End: 2021-07-13 | Stop reason: HOSPADM

## 2021-07-13 RX ORDER — BISACODYL 10 MG
10 SUPPOSITORY, RECTAL RECTAL
Status: DISCONTINUED | OUTPATIENT
Start: 2021-07-13 | End: 2021-07-15

## 2021-07-13 RX ORDER — POLYETHYLENE GLYCOL 3350 17 G/17G
17 POWDER, FOR SOLUTION ORAL DAILY PRN
Status: DISCONTINUED | OUTPATIENT
Start: 2021-07-13 | End: 2021-07-15

## 2021-07-13 RX ORDER — ONDANSETRON 2 MG/ML
4 INJECTION INTRAMUSCULAR; INTRAVENOUS EVERY 4 HOURS PRN
Status: DISCONTINUED | OUTPATIENT
Start: 2021-07-13 | End: 2021-07-15

## 2021-07-13 RX ORDER — INSULIN ASPART 100 [IU]/ML
INJECTION, SOLUTION INTRAVENOUS; SUBCUTANEOUS
Status: COMPLETED
Start: 2021-07-13 | End: 2021-07-13

## 2021-07-13 RX ORDER — METOCLOPRAMIDE HYDROCHLORIDE 5 MG/ML
INJECTION INTRAMUSCULAR; INTRAVENOUS AS NEEDED
Status: DISCONTINUED | OUTPATIENT
Start: 2021-07-13 | End: 2021-07-13 | Stop reason: SURG

## 2021-07-13 RX ORDER — ROCURONIUM BROMIDE 10 MG/ML
INJECTION, SOLUTION INTRAVENOUS AS NEEDED
Status: DISCONTINUED | OUTPATIENT
Start: 2021-07-13 | End: 2021-07-13 | Stop reason: SURG

## 2021-07-13 RX ORDER — HYDROMORPHONE HYDROCHLORIDE 1 MG/ML
INJECTION, SOLUTION INTRAMUSCULAR; INTRAVENOUS; SUBCUTANEOUS
Status: DISCONTINUED
Start: 2021-07-13 | End: 2021-07-13 | Stop reason: WASHOUT

## 2021-07-13 RX ORDER — NALOXONE HYDROCHLORIDE 0.4 MG/ML
80 INJECTION, SOLUTION INTRAMUSCULAR; INTRAVENOUS; SUBCUTANEOUS AS NEEDED
Status: DISCONTINUED | OUTPATIENT
Start: 2021-07-13 | End: 2021-07-13 | Stop reason: HOSPADM

## 2021-07-13 RX ORDER — MELATONIN
3 NIGHTLY PRN
Status: DISCONTINUED | OUTPATIENT
Start: 2021-07-13 | End: 2021-07-15

## 2021-07-13 RX ORDER — ONDANSETRON 2 MG/ML
INJECTION INTRAMUSCULAR; INTRAVENOUS AS NEEDED
Status: DISCONTINUED | OUTPATIENT
Start: 2021-07-13 | End: 2021-07-13 | Stop reason: SURG

## 2021-07-13 RX ORDER — CEFAZOLIN SODIUM/WATER 2 G/20 ML
2 SYRINGE (ML) INTRAVENOUS ONCE
Status: COMPLETED | OUTPATIENT
Start: 2021-07-13 | End: 2021-07-13

## 2021-07-13 RX ORDER — LIDOCAINE HYDROCHLORIDE AND EPINEPHRINE 10; 10 MG/ML; UG/ML
INJECTION, SOLUTION INFILTRATION; PERINEURAL AS NEEDED
Status: DISCONTINUED | OUTPATIENT
Start: 2021-07-13 | End: 2021-07-13 | Stop reason: HOSPADM

## 2021-07-13 RX ORDER — HYDROMORPHONE HYDROCHLORIDE 1 MG/ML
0.4 INJECTION, SOLUTION INTRAMUSCULAR; INTRAVENOUS; SUBCUTANEOUS EVERY 5 MIN PRN
Status: DISCONTINUED | OUTPATIENT
Start: 2021-07-13 | End: 2021-07-13 | Stop reason: HOSPADM

## 2021-07-13 RX ORDER — TRAMADOL HYDROCHLORIDE 50 MG/1
50 TABLET ORAL EVERY 6 HOURS
Status: DISCONTINUED | OUTPATIENT
Start: 2021-07-13 | End: 2021-07-14

## 2021-07-13 RX ORDER — SODIUM PHOSPHATE, DIBASIC AND SODIUM PHOSPHATE, MONOBASIC 7; 19 G/133ML; G/133ML
1 ENEMA RECTAL ONCE AS NEEDED
Status: DISCONTINUED | OUTPATIENT
Start: 2021-07-13 | End: 2021-07-15

## 2021-07-13 RX ORDER — DEXAMETHASONE SODIUM PHOSPHATE 10 MG/ML
8 INJECTION, SOLUTION INTRAMUSCULAR; INTRAVENOUS ONCE
Status: DISCONTINUED | OUTPATIENT
Start: 2021-07-14 | End: 2021-07-14

## 2021-07-13 RX ORDER — PHENYLEPHRINE HCL 10 MG/ML
VIAL (ML) INJECTION AS NEEDED
Status: DISCONTINUED | OUTPATIENT
Start: 2021-07-13 | End: 2021-07-13 | Stop reason: SURG

## 2021-07-13 RX ORDER — HYDROMORPHONE HYDROCHLORIDE 1 MG/ML
0.2 INJECTION, SOLUTION INTRAMUSCULAR; INTRAVENOUS; SUBCUTANEOUS EVERY 2 HOUR PRN
Status: DISCONTINUED | OUTPATIENT
Start: 2021-07-13 | End: 2021-07-14

## 2021-07-13 RX ORDER — HYDROCODONE BITARTRATE AND ACETAMINOPHEN 5; 325 MG/1; MG/1
1 TABLET ORAL AS NEEDED
Status: DISCONTINUED | OUTPATIENT
Start: 2021-07-13 | End: 2021-07-13 | Stop reason: HOSPADM

## 2021-07-13 RX ORDER — SODIUM CHLORIDE, SODIUM LACTATE, POTASSIUM CHLORIDE, CALCIUM CHLORIDE 600; 310; 30; 20 MG/100ML; MG/100ML; MG/100ML; MG/100ML
INJECTION, SOLUTION INTRAVENOUS CONTINUOUS
Status: DISCONTINUED | OUTPATIENT
Start: 2021-07-13 | End: 2021-07-13

## 2021-07-13 RX ORDER — ONDANSETRON 2 MG/ML
4 INJECTION INTRAMUSCULAR; INTRAVENOUS AS NEEDED
Status: DISCONTINUED | OUTPATIENT
Start: 2021-07-13 | End: 2021-07-13 | Stop reason: HOSPADM

## 2021-07-13 RX ORDER — AMITRIPTYLINE HYDROCHLORIDE 50 MG/1
50 TABLET, FILM COATED ORAL NIGHTLY
COMMUNITY

## 2021-07-13 RX ORDER — GLYCOPYRROLATE 0.2 MG/ML
INJECTION, SOLUTION INTRAMUSCULAR; INTRAVENOUS AS NEEDED
Status: DISCONTINUED | OUTPATIENT
Start: 2021-07-13 | End: 2021-07-13 | Stop reason: SURG

## 2021-07-13 RX ORDER — BUPIVACAINE HYDROCHLORIDE AND EPINEPHRINE 5; 5 MG/ML; UG/ML
INJECTION, SOLUTION EPIDURAL; INTRACAUDAL; PERINEURAL AS NEEDED
Status: DISCONTINUED | OUTPATIENT
Start: 2021-07-13 | End: 2021-07-13 | Stop reason: HOSPADM

## 2021-07-13 RX ORDER — ASPIRIN 81 MG/1
81 TABLET ORAL 2 TIMES DAILY
Status: DISCONTINUED | OUTPATIENT
Start: 2021-07-13 | End: 2021-07-15

## 2021-07-13 RX ORDER — HYDROCODONE BITARTRATE AND ACETAMINOPHEN 5; 325 MG/1; MG/1
2 TABLET ORAL AS NEEDED
Status: DISCONTINUED | OUTPATIENT
Start: 2021-07-13 | End: 2021-07-13 | Stop reason: HOSPADM

## 2021-07-13 RX ORDER — OXYCODONE HYDROCHLORIDE 5 MG/1
5 TABLET ORAL
Qty: 25 TABLET | Refills: 0 | Status: SHIPPED | OUTPATIENT
Start: 2021-07-13 | End: 2021-07-15

## 2021-07-13 RX ORDER — ONDANSETRON 4 MG/1
4 TABLET, ORALLY DISINTEGRATING ORAL EVERY 8 HOURS PRN
Qty: 8 TABLET | Refills: 0 | Status: SHIPPED | OUTPATIENT
Start: 2021-07-13 | End: 2021-09-02

## 2021-07-13 RX ORDER — LIDOCAINE HYDROCHLORIDE 10 MG/ML
INJECTION, SOLUTION EPIDURAL; INFILTRATION; INTRACAUDAL; PERINEURAL AS NEEDED
Status: DISCONTINUED | OUTPATIENT
Start: 2021-07-13 | End: 2021-07-13 | Stop reason: SURG

## 2021-07-13 RX ORDER — HYDROMORPHONE HYDROCHLORIDE 1 MG/ML
0.4 INJECTION, SOLUTION INTRAMUSCULAR; INTRAVENOUS; SUBCUTANEOUS EVERY 2 HOUR PRN
Status: DISCONTINUED | OUTPATIENT
Start: 2021-07-13 | End: 2021-07-14

## 2021-07-13 RX ORDER — TIMOLOL MALEATE 5 MG/ML
1 SOLUTION/ DROPS OPHTHALMIC 2 TIMES DAILY
COMMUNITY

## 2021-07-13 RX ORDER — ACETAMINOPHEN 325 MG/1
650 TABLET ORAL 4 TIMES DAILY
Status: DISCONTINUED | OUTPATIENT
Start: 2021-07-13 | End: 2021-07-15

## 2021-07-13 RX ORDER — TRANEXAMIC ACID 10 MG/ML
1000 INJECTION, SOLUTION INTRAVENOUS ONCE
Status: COMPLETED | OUTPATIENT
Start: 2021-07-13 | End: 2021-07-13

## 2021-07-13 RX ORDER — ACETAMINOPHEN 500 MG
1000 TABLET ORAL ONCE
COMMUNITY

## 2021-07-13 RX ORDER — TIMOLOL MALEATE 5 MG/ML
1 SOLUTION/ DROPS OPHTHALMIC 2 TIMES DAILY
Status: DISCONTINUED | OUTPATIENT
Start: 2021-07-13 | End: 2021-07-15

## 2021-07-13 RX ORDER — DEXTROSE MONOHYDRATE 25 G/50ML
50 INJECTION, SOLUTION INTRAVENOUS
Status: DISCONTINUED | OUTPATIENT
Start: 2021-07-13 | End: 2021-07-13 | Stop reason: HOSPADM

## 2021-07-13 RX ORDER — NEOSTIGMINE METHYLSULFATE 1 MG/ML
INJECTION INTRAVENOUS AS NEEDED
Status: DISCONTINUED | OUTPATIENT
Start: 2021-07-13 | End: 2021-07-13 | Stop reason: SURG

## 2021-07-13 RX ORDER — AMITRIPTYLINE HYDROCHLORIDE 50 MG/1
50 TABLET, FILM COATED ORAL NIGHTLY
Status: DISCONTINUED | OUTPATIENT
Start: 2021-07-13 | End: 2021-07-13

## 2021-07-13 RX ORDER — HYDROMORPHONE HYDROCHLORIDE 1 MG/ML
INJECTION, SOLUTION INTRAMUSCULAR; INTRAVENOUS; SUBCUTANEOUS
Status: COMPLETED
Start: 2021-07-13 | End: 2021-07-13

## 2021-07-13 RX ORDER — INSULIN ASPART 100 [IU]/ML
5 INJECTION, SOLUTION INTRAVENOUS; SUBCUTANEOUS ONCE
Status: COMPLETED | OUTPATIENT
Start: 2021-07-13 | End: 2021-07-13

## 2021-07-13 RX ORDER — SODIUM CHLORIDE 9 MG/ML
INJECTION, SOLUTION INTRAVENOUS CONTINUOUS
Status: DISCONTINUED | OUTPATIENT
Start: 2021-07-13 | End: 2021-07-14

## 2021-07-13 RX ORDER — ACETAMINOPHEN 325 MG/1
650 TABLET ORAL ONCE
Status: DISCONTINUED | OUTPATIENT
Start: 2021-07-13 | End: 2021-07-13 | Stop reason: HOSPADM

## 2021-07-13 RX ORDER — HYDROCODONE BITARTRATE AND ACETAMINOPHEN 5; 325 MG/1; MG/1
1 TABLET ORAL EVERY 4 HOURS PRN
Status: DISCONTINUED | OUTPATIENT
Start: 2021-07-13 | End: 2021-07-14

## 2021-07-13 RX ORDER — HYDROCODONE BITARTRATE AND ACETAMINOPHEN 5; 325 MG/1; MG/1
2 TABLET ORAL EVERY 4 HOURS PRN
Status: DISCONTINUED | OUTPATIENT
Start: 2021-07-13 | End: 2021-07-14

## 2021-07-13 RX ORDER — DOCUSATE SODIUM 100 MG/1
100 CAPSULE, LIQUID FILLED ORAL 2 TIMES DAILY
Status: DISCONTINUED | OUTPATIENT
Start: 2021-07-13 | End: 2021-07-15

## 2021-07-13 RX ORDER — OXYCODONE HYDROCHLORIDE 5 MG/1
2.5 TABLET ORAL EVERY 4 HOURS PRN
Status: DISCONTINUED | OUTPATIENT
Start: 2021-07-13 | End: 2021-07-14

## 2021-07-13 RX ADMIN — METOCLOPRAMIDE HYDROCHLORIDE 10 MG: 5 INJECTION INTRAMUSCULAR; INTRAVENOUS at 12:12:00

## 2021-07-13 RX ADMIN — ROCURONIUM BROMIDE 20 MG: 10 INJECTION, SOLUTION INTRAVENOUS at 11:30:00

## 2021-07-13 RX ADMIN — ROCURONIUM BROMIDE 45 MG: 10 INJECTION, SOLUTION INTRAVENOUS at 10:17:00

## 2021-07-13 RX ADMIN — PHENYLEPHRINE HCL 100 MCG: 10 MG/ML VIAL (ML) INJECTION at 10:38:00

## 2021-07-13 RX ADMIN — SODIUM CHLORIDE, SODIUM LACTATE, POTASSIUM CHLORIDE, CALCIUM CHLORIDE: 600; 310; 30; 20 INJECTION, SOLUTION INTRAVENOUS at 12:52:00

## 2021-07-13 RX ADMIN — LIDOCAINE HYDROCHLORIDE 50 MG: 10 INJECTION, SOLUTION EPIDURAL; INFILTRATION; INTRACAUDAL; PERINEURAL at 10:17:00

## 2021-07-13 RX ADMIN — TRANEXAMIC ACID 1000 MG: 10 INJECTION, SOLUTION INTRAVENOUS at 10:33:00

## 2021-07-13 RX ADMIN — PHENYLEPHRINE HCL 100 MCG: 10 MG/ML VIAL (ML) INJECTION at 10:33:00

## 2021-07-13 RX ADMIN — NEOSTIGMINE METHYLSULFATE 5 MG: 1 INJECTION INTRAVENOUS at 12:29:00

## 2021-07-13 RX ADMIN — GLYCOPYRROLATE 0.5 MG: 0.2 INJECTION, SOLUTION INTRAMUSCULAR; INTRAVENOUS at 12:29:00

## 2021-07-13 RX ADMIN — PHENYLEPHRINE HCL 100 MCG: 10 MG/ML VIAL (ML) INJECTION at 10:17:00

## 2021-07-13 RX ADMIN — CEFAZOLIN SODIUM/WATER 2 G: 2 G/20 ML SYRINGE (ML) INTRAVENOUS at 10:30:00

## 2021-07-13 RX ADMIN — PHENYLEPHRINE HCL 100 MCG: 10 MG/ML VIAL (ML) INJECTION at 10:23:00

## 2021-07-13 RX ADMIN — ONDANSETRON 4 MG: 2 INJECTION INTRAMUSCULAR; INTRAVENOUS at 12:12:00

## 2021-07-13 NOTE — OPERATIVE REPORT
[facility] OPERATIVE RECORD  ATTENDING SURGEON: Aida Perez MD  ASSISTANT(S): Moisés Earl [place], [place] [facility]  PRELIMINARY DIAGNOSIS:  1. Left Shoulder Rotator Cuff Tear Arthropathy  2.    Left Biceps Tendinitis     POSTOPE Retracted supraspinatus tear and mild teres intact but infraspinatus torn.   Glenoid: Walch 1  [place]: Type 1  Bone grafting: None  Biceps: tenodesis to subscapularis  Indications:  Melani Rene is a 79year old male with Right hand dominant male left shoulder ma throughout the case.      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 A 45 degree neck-shaft angle cut was made in approximately 25 degrees of retroversion using an oscillating saw with appropriate retractors for soft tissue protection. The intramedullary guide was utilized.  Humer was seated into position with offset anteroinferior and malleted into place to secure the [facility] CARE [facility]. Attention was turned back to the humerus.  Three drill holes were placed in the proximal humerus and Suture tape sutures were passed in looped config Precautions: 2 lb WB, ROMAT  2. DVT Prophylaxis: ASA 81 mg BID  3. Follow-up Visit: 2 weeks post surgery. Rylan Ruiz.  Mame James MD  Knee, Shoulder, & Elbow Surgery / Sports Medicine Specialist  [facility]  [facility] [address], [address], [place]

## 2021-07-13 NOTE — INTERVAL H&P NOTE
Pre-op Diagnosis: Rotator cuff tear arthropathy, left [M75.102, M12.812]    The above referenced H&P was reviewed by Jermaine Conde MD on 7/13/2021, the patient was examined and no significant changes have occurred in the patient's condition since the

## 2021-07-13 NOTE — PLAN OF CARE
NURSING ADMISSION NOTE      Patient admitted via  bed from PACU. Oriented to room. Safety precautions initiated. Bed in low position. Call light in reach. Dr. Graham Hopper notified of consult.  Left shoulder dressing dry and intact, radial pulse strong,

## 2021-07-13 NOTE — CONSULTS
[facility]  [address] [place] [address] Patient Status:  Inpatient    1940 MRN [id number]   [facility] 3SW-A Attending Enriqueta Camacho MD   Hosp Day # 0 PCP Vivienne Chahal, [facility] [address]     Reason for consult: Diabetes melli Disp: , Rfl:   Multiple Vitamins-Minerals (PRESERVISION AREDS 2 OR), Take 1 tablet by mouth daily. , Disp: , Rfl:   atorvastatin 40 MG Oral Tab, Take 40 mg by mouth daily. , Disp: , Rfl:   metFORMIN HCl 500 MG Oral Tab, Take 1,000 mg by mouth 2 (two) times last 168 hours. Inflammatory Markers  No results for input(s): CRP, DANG, LDH, DDIMER in the last 168 hours. Imaging: Imaging data reviewed in Epic. ASSESSMENT / PLAN:     1.  Left rotator cuff tear, arthropathy sp left reverse shoulder arthroplas

## 2021-07-13 NOTE — ANESTHESIA PREPROCEDURE EVALUATION
PRE-OP EVALUATION    Patient Name: Mae Gorman    Admit Diagnosis: Rotator cuff tear arthropathy, left [M75.102, M12.812]    Pre-op Diagnosis: Rotator cuff tear arthropathy, left [M75.102, M12.812]    LEFT REVERSE SHOULDER ARTHROPLASTY.      Anesthesi Unknown time  atorvastatin 40 MG Oral Tab, Take 40 mg by mouth daily. , Disp: , Rfl: , 7/12/2021 at 2000  metFORMIN HCl 500 MG Oral Tab, Take 1,000 mg by mouth 2 (two) times daily with meals.   , Disp: , Rfl: , 7/12/2021 at 0800  Empagliflozin (Keely Redding) 25 07/06/2021    CA 9.2 07/06/2021            Airway      Mallampati: II  Mouth opening: >3 FB  TM distance: 4 - 6 cm  Neck ROM: full Cardiovascular      Rhythm: regular  Rate: normal     Dental      Dental appliance(s): partials and lower dentures       Pulm

## 2021-07-13 NOTE — ANESTHESIA POSTPROCEDURE EVALUATION
[address] [facility] Patient Status:  Inpatient   Age/Gender 79year old male MRN [id number]   [place] [facility] Attending Dennis Moise MD   Hosp Day # 0 PCP Guillermo Toro [facility]       Anesthesia Post-op Note    LEFT

## 2021-07-13 NOTE — BRIEF OP NOTE
Pre-Operative Diagnosis: Rotator cuff tear arthropathy, left [M75.102, M12.812]     Post-Operative Diagnosis: Rotator cuff tear arthropathy, left [M75.102, M12.812]      Procedure Performed:   LEFT REVERSE SHOULDER ARTHROPLASTY.  Open biceps tenodesis    Liz

## 2021-07-13 NOTE — ANESTHESIA PROCEDURE NOTES
Airway  Date/Time: 7/13/2021 10:17 AM  Urgency: elective      General Information and Staff    Patient location during procedure: OR  Anesthesiologist: Roshni Chris DO  Performed: anesthesiologist     Indications and Patient Condition  Indications for airwa

## 2021-07-14 ENCOUNTER — APPOINTMENT (OUTPATIENT)
Dept: GENERAL RADIOLOGY | Facility: HOSPITAL | Age: 81
DRG: 483 | End: 2021-07-14
Attending: HOSPITALIST
Payer: MEDICARE

## 2021-07-14 LAB
ALBUMIN SERPL-MCNC: 2.9 G/DL (ref 3.4–5)
ALBUMIN/GLOB SERPL: 0.9 {RATIO} (ref 1–2)
ALP LIVER SERPL-CCNC: 64 U/L
ALT SERPL-CCNC: 26 U/L
ANION GAP SERPL CALC-SCNC: 4 MMOL/L (ref 0–18)
AST SERPL-CCNC: 21 U/L (ref 15–37)
BASOPHILS # BLD AUTO: 0.02 X10(3) UL (ref 0–0.2)
BASOPHILS NFR BLD AUTO: 0.2 %
BILIRUB SERPL-MCNC: 0.8 MG/DL (ref 0.1–2)
BUN BLD-MCNC: 28 MG/DL (ref 7–18)
BUN/CREAT SERPL: 31.8 (ref 10–20)
CALCIUM BLD-MCNC: 8 MG/DL (ref 8.5–10.1)
CHLORIDE SERPL-SCNC: 105 MMOL/L (ref 98–112)
CO2 SERPL-SCNC: 26 MMOL/L (ref 21–32)
CREAT BLD-MCNC: 0.88 MG/DL
DEPRECATED RDW RBC AUTO: 50 FL (ref 35.1–46.3)
EOSINOPHIL # BLD AUTO: 0.02 X10(3) UL (ref 0–0.7)
EOSINOPHIL NFR BLD AUTO: 0.2 %
ERYTHROCYTE [DISTWIDTH] IN BLOOD BY AUTOMATED COUNT: 14.1 % (ref 11–15)
EST. AVERAGE GLUCOSE BLD GHB EST-MCNC: 200 MG/DL (ref 68–126)
GLOBULIN PLAS-MCNC: 3.1 G/DL (ref 2.8–4.4)
GLUCOSE BLD-MCNC: 136 MG/DL (ref 70–99)
GLUCOSE BLD-MCNC: 174 MG/DL (ref 70–99)
GLUCOSE BLD-MCNC: 193 MG/DL (ref 70–99)
GLUCOSE BLD-MCNC: 203 MG/DL (ref 70–99)
GLUCOSE BLD-MCNC: 222 MG/DL (ref 70–99)
HBA1C MFR BLD HPLC: 8.6 % (ref ?–5.7)
HCT VFR BLD AUTO: 40.5 %
HGB BLD-MCNC: 12.6 G/DL
IMM GRANULOCYTES # BLD AUTO: 0.01 X10(3) UL (ref 0–1)
IMM GRANULOCYTES NFR BLD: 0.1 %
LYMPHOCYTES # BLD AUTO: 0.97 X10(3) UL (ref 1–4)
LYMPHOCYTES NFR BLD AUTO: 11.3 %
M PROTEIN MFR SERPL ELPH: 6 G/DL (ref 6.4–8.2)
MCH RBC QN AUTO: 30.2 PG (ref 26–34)
MCHC RBC AUTO-ENTMCNC: 31.1 G/DL (ref 31–37)
MCV RBC AUTO: 97.1 FL
MONOCYTES # BLD AUTO: 1.49 X10(3) UL (ref 0.1–1)
MONOCYTES NFR BLD AUTO: 17.4 %
NEUTROPHILS # BLD AUTO: 6.05 X10 (3) UL (ref 1.5–7.7)
NEUTROPHILS # BLD AUTO: 6.05 X10(3) UL (ref 1.5–7.7)
NEUTROPHILS NFR BLD AUTO: 70.8 %
OSMOLALITY SERPL CALC.SUM OF ELEC: 291 MOSM/KG (ref 275–295)
PLATELET # BLD AUTO: 178 10(3)UL (ref 150–450)
POTASSIUM SERPL-SCNC: 4.4 MMOL/L (ref 3.5–5.1)
RBC # BLD AUTO: 4.17 X10(6)UL
SODIUM SERPL-SCNC: 135 MMOL/L (ref 136–145)
WBC # BLD AUTO: 8.6 X10(3) UL (ref 4–11)

## 2021-07-14 PROCEDURE — 99233 SBSQ HOSP IP/OBS HIGH 50: CPT | Performed by: HOSPITALIST

## 2021-07-14 PROCEDURE — 71045 X-RAY EXAM CHEST 1 VIEW: CPT | Performed by: HOSPITALIST

## 2021-07-14 RX ORDER — SODIUM CHLORIDE 9 MG/ML
INJECTION, SOLUTION INTRAVENOUS CONTINUOUS
Status: DISCONTINUED | OUTPATIENT
Start: 2021-07-14 | End: 2021-07-15

## 2021-07-14 RX ORDER — TAMSULOSIN HYDROCHLORIDE 0.4 MG/1
0.4 CAPSULE ORAL DAILY
Status: DISCONTINUED | OUTPATIENT
Start: 2021-07-14 | End: 2021-07-15

## 2021-07-14 RX ORDER — LIDOCAINE HYDROCHLORIDE 20 MG/ML
20 JELLY TOPICAL ONCE
Status: DISCONTINUED | OUTPATIENT
Start: 2021-07-14 | End: 2021-07-15

## 2021-07-14 NOTE — PROGRESS NOTES
Pt getting agitated, wants to discharge home. Pt still not able to void, has drank several glasses of fluids. Pt appears confused at times, will ask what his  cord is, pull at it, try to stand up.   Pt impulsive, stood up and lost balance, leaned back

## 2021-07-14 NOTE — PROGRESS NOTES
[facility]  Progress Note     [address] [facility] [place] Patient Status:  Inpatient    1940 MRN [id number]   [facility] 3SW-A Attending Suri Leija MD   [facility] Day # 1 PCP Gonzalo AU     Chief Complaint: Post-op left in the last 168 hours. Inflammatory Markers  No results for input(s): CRP, DANG, LDH, DDIMER in the last 168 hours. Imaging: Imaging data reviewed in Epic.     Medications:   • sodium chloride 0.9%  1,000 mL Intravenous Once   • docusate sodium  100 mg

## 2021-07-14 NOTE — PROGRESS NOTES
Bladder scan 510ml, RN tried to straight cath x 2, not able to get past prostate. Paged Dr. Jordan Lacey to inform.

## 2021-07-14 NOTE — PROGRESS NOTES
Updated pt and wife on POC. Pt's wife very upset, states, \"absolutely not. You have to put him out if you want to put a catheter in him. \"  Pt states, \" I cannot do that again. \"  Paged Dr. Vivi Krause to inform. Waiting response.

## 2021-07-14 NOTE — OCCUPATIONAL THERAPY NOTE
OCCUPATIONAL THERAPY QUICK EVALUATION - INPATIENT    Room Number: 385/385-A  Evaluation Date: 7/14/2021     Type of Evaluation: Quick Eval  Presenting Problem: s/p L reverse TSA, L biceps tenodesis 7/13/21    Physician Order: IP Consult to Occupational The 0  Location: denies       COGNITION  WFL    RANGE OF MOTION AND STRENGTH ASSESSMENT  Upper extremity ROM is within functional limits except for the following:  Left Shoulder flexion     Left Second Digit flexion, reports impaired since recent fall    Upper and intends for her to continue to do so) with cueing for technique; performed simulated LB dressing to knees primarily SBA (reports PCT helped with socks, reports intends for wife to assist, not interested in learning to perform independently this session services needed at this time.        Patient Complexity  Occupational Profile/Medical History  LOW - Brief history including review of medical or therapy records    Specific performance deficits impacting engagement in ADL/IADL  LOW  1 - 3 performance defic

## 2021-07-14 NOTE — PROGRESS NOTES
Pt sats 84% on RA while sleeping sitting up in chair. When pt awake his O2 sats between 88-93% on RA. Pt sats 95% on 2L oxygen. Encouraged I/S. Pt gets up to 1750. Pt still hasn't voided. Bladder scan at 1100 was 189ml. Pt increasing p.o. fluids.   Tc Carl

## 2021-07-14 NOTE — PLAN OF CARE
A&O x4, some confusion at times overnight. VSS. 3-4L O2 per nc overnight. NSR/SB on tele. Reports mild pain to L shoulder, controlled with PO pain meds. Ambulating with SBA. DTV. Straight cath x1 per protocol, 600mL out at 0545. Bilateral SCDs.  Gauze and t

## 2021-07-14 NOTE — PROGRESS NOTES
Spoke to Dr. Dave Reynolds with urology regarding consult. He suggests using urojet then using 16F coude catheter. RN informed Dr. Dave Reynolds that we already tried a 16F coude catheter and that pt is tensing up and not able to get past the prostate.   Per Dr. Irene Nichole

## 2021-07-14 NOTE — PROGRESS NOTES
Pt took tele monitor off, oxygen off,  off, was up walking in the hallway trying to get dressed to go home. Pt pulled out PIV. PCT helped pt back to room and get dressed. Pt refusing to wear immobilizer. XR called to come do CXR.   RN put new pulse ox

## 2021-07-14 NOTE — PROGRESS NOTES
[facility] Progress Note    Subjective: Doing well overall pain controlled. Objective: Well-appearing dressing clean and dry. No significant ecchymosis left upper extremity.   Patient able to demonstrate elbow flexion extension as well as hand wrist and

## 2021-07-15 VITALS
SYSTOLIC BLOOD PRESSURE: 115 MMHG | HEART RATE: 55 BPM | BODY MASS INDEX: 28.18 KG/M2 | HEIGHT: 70 IN | TEMPERATURE: 99 F | WEIGHT: 196.88 LBS | OXYGEN SATURATION: 93 % | DIASTOLIC BLOOD PRESSURE: 57 MMHG | RESPIRATION RATE: 18 BRPM

## 2021-07-15 PROBLEM — M12.812 ROTATOR CUFF TEAR ARTHROPATHY, LEFT: Status: ACTIVE | Noted: 2021-07-15

## 2021-07-15 PROBLEM — M75.102 ROTATOR CUFF TEAR ARTHROPATHY, LEFT: Status: ACTIVE | Noted: 2021-07-15

## 2021-07-15 LAB
GLUCOSE BLD-MCNC: 164 MG/DL (ref 70–99)
GLUCOSE BLD-MCNC: 174 MG/DL (ref 70–99)

## 2021-07-15 PROCEDURE — 99232 SBSQ HOSP IP/OBS MODERATE 35: CPT | Performed by: HOSPITALIST

## 2021-07-15 RX ORDER — TAMSULOSIN HYDROCHLORIDE 0.4 MG/1
0.4 CAPSULE ORAL DAILY
Qty: 30 CAPSULE | Refills: 0 | Status: SHIPPED | OUTPATIENT
Start: 2021-07-15 | End: 2021-08-14

## 2021-07-15 RX ORDER — ASPIRIN 81 MG/1
81 TABLET ORAL 2 TIMES DAILY
Qty: 56 TABLET | Refills: 0 | Status: SHIPPED | COMMUNITY
Start: 2021-07-15

## 2021-07-15 RX ORDER — POLYETHYLENE GLYCOL 3350 17 G/17G
17 POWDER, FOR SOLUTION ORAL DAILY
Status: DISCONTINUED | OUTPATIENT
Start: 2021-07-15 | End: 2021-07-15

## 2021-07-15 NOTE — PROGRESS NOTES
[facility]  Progress Note     [address] [facility] [place] Patient Status:  Inpatient    1940 MRN [id number]   [facility] 3SW-A Attending Oscar Pierre MD   [facility] Day # 2 PCP Debbie AU     Chief Complaint: Post-op left DDIMER in the last 168 hours. Imaging: Imaging data reviewed in Epic.     Medications:   • PEG 3350  17 g Oral Daily   • lidocaine  20 mL Urethral Once   • tamsulosin HCl  0.4 mg Oral Daily   • docusate sodium  100 mg Oral BID   • aspirin  81 mg Oral BID

## 2021-07-15 NOTE — PLAN OF CARE
A&O x 3, some confusion/forgetfulness this evening. Reorient as needed, pt aware he has been intermittently confused. VSS. O2 3L per NC. Tele-NSR. No c/o pain to LUE, Tylenol given as scheduled. Dressing to left shoulder C/D/I. Immobilizer in place.  Up wit

## 2021-07-15 NOTE — PLAN OF CARE
Pain managed with oral tylenol. Patient ambulated in cordon, greater than 400 feet. Gait steady. O2 sat on room air 93%. Left shoulder dressing dry and intact, radial pulse strong, hand grasp good. Patient voided twice without difficulty.  Will do post void r

## 2021-07-15 NOTE — PLAN OF CARE
Bladder scan done = 400 ml and then patient able to urinate 400 ml. Patient will follow up with urology as outpatient. Written and Verbal discharge instructions given to patient. Reviewed AVS and Joint Replacement Discharge Instructions handout.   Patient

## 2021-07-19 ENCOUNTER — TELEPHONE (OUTPATIENT)
Dept: ORTHOPEDICS CLINIC | Facility: CLINIC | Age: 81
End: 2021-07-19

## 2021-07-19 NOTE — TELEPHONE ENCOUNTER
Physical Therapist called to ask Dr. Ras Luis if patient is being treated for a subscapularis repair. Physical therapist will also like to verify if patient shoulder be in a sling only or if the mobilizers cushion should be used as well.  Please call PT at [phone]

## 2021-07-20 ENCOUNTER — OFFICE VISIT (OUTPATIENT)
Dept: ORTHOPEDICS CLINIC | Facility: CLINIC | Age: 81
End: 2021-07-20
Payer: MEDICARE

## 2021-07-20 DIAGNOSIS — Z96.612 STATUS POST REPLACEMENT OF LEFT SHOULDER JOINT: Primary | ICD-10-CM

## 2021-07-20 PROCEDURE — 99024 POSTOP FOLLOW-UP VISIT: CPT | Performed by: ORTHOPAEDIC SURGERY

## 2021-07-20 PROCEDURE — 1111F DSCHRG MED/CURRENT MED MERGE: CPT | Performed by: ORTHOPAEDIC SURGERY

## 2021-07-26 NOTE — PROGRESS NOTES
[facility]  [address]     Name: Yordan Aguilar   MRN: [id number]  Date: 7/20/2021     REASON FOR VISIT: First Post-Surgical Visit  Date of Surgery: 7/13/2021 light touch and full strength. IMPRESSION: Jax Curtis is a 79year old male POD #7 s/p left reverse shoulder arthroplasty, doing well without concerns. PLAN:   Continue with rehabilitative efforts.   He may follow the reverse shoulder arthr

## 2021-07-26 NOTE — DISCHARGE SUMMARY
[facility]  Discharge Summary    [address] [facility] [place] Patient Status:  Inpatient    1940 MRN [id number]   [facility] 3SW-A Attending No att. providers found   Hosp Day # 2 PCP Justice Dy     Date of Admission: 2021 Care Services    Discharge Condition: Good    Discharge Medications: Discharge Medication List as of 7/15/2021  3:53 PM    START taking these medications    tamsulosin (FLOMAX) cap  Take 1 capsule (0.4 mg total) by mouth daily. , Normal, Disp-30 capsule, R-

## 2021-08-02 ENCOUNTER — TELEPHONE (OUTPATIENT)
Dept: ORTHOPEDICS CLINIC | Facility: CLINIC | Age: 81
End: 2021-08-02

## 2021-08-02 NOTE — TELEPHONE ENCOUNTER
Patient is currently scheduled on 9/2 with Dr. Vinicius Kumar for numbness in the left thumb and left index finger. Should patient be seen any sooner?

## 2021-08-02 NOTE — TELEPHONE ENCOUNTER
Pablo Redding MD  [facility]  Pool; [facility] Clinical Pool 3 minutes ago (10:25 AM)     9/2 is okay.

## 2021-08-17 ENCOUNTER — HOSPITAL ENCOUNTER (OUTPATIENT)
Dept: GENERAL RADIOLOGY | Age: 81
Discharge: HOME | End: 2021-08-17
Attending: ORTHOPAEDIC SURGERY
Payer: MEDICARE

## 2021-08-17 ENCOUNTER — OFFICE VISIT (OUTPATIENT)
Dept: ORTHOPEDICS CLINIC | Facility: CLINIC | Age: 81
End: 2021-08-17
Payer: MEDICARE

## 2021-08-17 DIAGNOSIS — Z96.612 STATUS POST REPLACEMENT OF LEFT SHOULDER JOINT: ICD-10-CM

## 2021-08-17 DIAGNOSIS — Z96.612 STATUS POST REPLACEMENT OF LEFT SHOULDER JOINT: Primary | ICD-10-CM

## 2021-08-17 PROCEDURE — 73030 X-RAY EXAM OF SHOULDER: CPT | Performed by: ORTHOPAEDIC SURGERY

## 2021-08-17 PROCEDURE — 99024 POSTOP FOLLOW-UP VISIT: CPT | Performed by: ORTHOPAEDIC SURGERY

## 2021-08-17 NOTE — PROGRESS NOTES
[facility]  [address] [place] [phone]     Name: Delfina Tanner   MRN: [id number]  Date: 8/17/2021    REASON FOR VISIT: Second Post-Surgical Visit  Date of Surgery: 7/13/2021 Mental Status: She is alert. Psychiatric:         Mood and Affect: Mood normal.     Examination of the left shoulder demonstrates:     Well-appearing surgical incision over the anterior shoulder. Stiffness to external rotation.   Able to reach behind t

## 2021-09-02 ENCOUNTER — OFFICE VISIT (OUTPATIENT)
Dept: ORTHOPEDICS CLINIC | Facility: CLINIC | Age: 81
End: 2021-09-02
Payer: MEDICARE

## 2021-09-02 ENCOUNTER — OFFICE VISIT (OUTPATIENT)
Dept: SURGERY | Facility: CLINIC | Age: 81
End: 2021-09-02
Payer: MEDICARE

## 2021-09-02 VITALS — HEART RATE: 78 BPM | OXYGEN SATURATION: 97 %

## 2021-09-02 DIAGNOSIS — G56.12 MEDIAN NERVE DYSFUNCTION, LEFT: Primary | ICD-10-CM

## 2021-09-02 DIAGNOSIS — E11.69 TYPE 2 DIABETES MELLITUS WITH OTHER SPECIFIED COMPLICATION, WITHOUT LONG-TERM CURRENT USE OF INSULIN (HCC): ICD-10-CM

## 2021-09-02 DIAGNOSIS — R33.9 INCOMPLETE EMPTYING OF BLADDER: Primary | ICD-10-CM

## 2021-09-02 PROCEDURE — 51798 US URINE CAPACITY MEASURE: CPT | Performed by: UROLOGY

## 2021-09-02 PROCEDURE — 99203 OFFICE O/P NEW LOW 30 MIN: CPT | Performed by: PHYSICIAN ASSISTANT

## 2021-09-02 PROCEDURE — 51798 US URINE CAPACITY MEASURE: CPT | Performed by: PHYSICIAN ASSISTANT

## 2021-09-02 PROCEDURE — 99213 OFFICE O/P EST LOW 20 MIN: CPT | Performed by: ORTHOPAEDIC SURGERY

## 2021-09-02 RX ORDER — TAMSULOSIN HYDROCHLORIDE 0.4 MG/1
0.4 CAPSULE ORAL DAILY
Qty: 30 CAPSULE | Refills: 0 | Status: SHIPPED | OUTPATIENT
Start: 2021-09-02 | End: 2021-09-02

## 2021-09-02 NOTE — PROGRESS NOTES
HPI:     Chaparro Mariscal is a 79year old male with a PMH of type II DM, glaucoma, OA, who presents today as a consult from Dr. Ivonne Russo office for post operative urinary retention/incomplete emptying.     Patient was admitted post left reve Diabetes (RUSTca 75.)    • Glaucoma    • High cholesterol    • Osteoarthritis    • Visual impairment     glasses for reading      Past Surgical History:   Procedure Laterality Date   • COLONOSCOPY     • HIP REPLACEMENT SURGERY     • OTHER      parotid sx   • MARLENY non-icteric  EARS: hearing intact  ORAL CAVITY: mucosa moist  NECK/THYROID: no obvious goiter or masses  LUNGS: nonlabored breathing  ABDOMEN: soft, no obvious masses or tenderness  SKIN: no obvious rashes  MELINDA shows ~ 25 g prostate, no nodules or tenderne

## 2021-09-15 ENCOUNTER — TELEPHONE (OUTPATIENT)
Dept: PHYSICAL MEDICINE AND REHAB | Facility: CLINIC | Age: 81
End: 2021-09-15

## 2021-09-15 ENCOUNTER — TELEPHONE (OUTPATIENT)
Dept: ORTHOPEDICS CLINIC | Facility: CLINIC | Age: 81
End: 2021-09-15

## 2021-09-15 NOTE — TELEPHONE ENCOUNTER
Verified voicemail reached with verbal consent signed. Attempted to call [place] Mole wife Babita Hensley regarding notification EMG was ordered/can be scheduled through central scheduling.  Reached voicemail, left voicemail and provided a detail message wit

## 2021-09-15 NOTE — TELEPHONE ENCOUNTER
Patient's wife is calling back re: EMG/NCV authorization following the patient's 9/2/21 office visit. The OV is incomplete, no Order in there for an EMG.   Please advise patient, enter the referral.

## 2021-09-16 NOTE — TELEPHONE ENCOUNTER
Patient's wife returning call from [place] regarding the EMG test she states 9/30/21 will not work they are open the 1st week of October

## 2021-10-13 ENCOUNTER — OFFICE VISIT (OUTPATIENT)
Dept: ORTHOPEDICS CLINIC | Facility: CLINIC | Age: 81
End: 2021-10-13
Payer: MEDICARE

## 2021-10-13 DIAGNOSIS — Z96.612 STATUS POST REPLACEMENT OF LEFT SHOULDER JOINT: Primary | ICD-10-CM

## 2021-10-13 PROCEDURE — 99213 OFFICE O/P EST LOW 20 MIN: CPT | Performed by: ORTHOPAEDIC SURGERY

## 2021-10-14 ENCOUNTER — PROCEDURE VISIT (OUTPATIENT)
Dept: PHYSICAL MEDICINE AND REHAB | Facility: CLINIC | Age: 81
End: 2021-10-14
Payer: MEDICARE

## 2021-10-14 DIAGNOSIS — G56.12 MEDIAN NERVE DYSFUNCTION, LEFT: ICD-10-CM

## 2021-10-14 DIAGNOSIS — R29.898 LEFT HAND WEAKNESS: Primary | ICD-10-CM

## 2021-10-15 NOTE — PROCEDURES
[address]   Nerve Conduction Study and Electromyography Procedure Note      Patient: jassi hope Hand Dominance:      Patient ID: [id number] Referring Dr:  Dr. Yolanda Bansal   Sex: Male Test Dr:  [place]   Date o 5.42 Wrist - FDI 15        B. Elbow FDI 8.96 5.0 105 6.09 B. Elbow - Wrist 21.5 4.48 48      A. Elbow FDI 11.61 4.7 99.7 6.15 A. Elbow - B. Elbow 10 2.66 38   R Ulnar - FDI      Wrist FDI 4.38 9.9 100 4.48 Wrist - FDI 15        B. Elbow FDI 8.80 9.0 91.2 5.05 B. None None Normal N N N N   R. Deltoid Axillary C5-C6 N None None None None Normal N N N Reduced   R. Biceps brachii Musculocutaneous C5-C6 N None None None None Normal N N N Reduced   R.  Triceps brachii Radial C6-C8 N None None None None Normal N N N Reduc FDI demonstrates prolonged latency, decreased amplitude, and slowed conduction velocity      Electromyography    The needle EMG examination was performed in 13 muscles. It was normal in 4 muscle(s): L. Triceps brachii, L.  Abductor digiti minimi (manus), R. Collin Najera

## 2021-10-18 ENCOUNTER — TELEPHONE (OUTPATIENT)
Dept: ORTHOPEDICS CLINIC | Facility: CLINIC | Age: 81
End: 2021-10-18

## 2021-10-18 DIAGNOSIS — Z96.612 STATUS POST REPLACEMENT OF LEFT SHOULDER JOINT: Primary | ICD-10-CM

## 2021-10-18 NOTE — TELEPHONE ENCOUNTER
[facility] was calling for a new updated script for 1x/week for 4 weeks for patient. PT script pended to this encounter for review. Thank you!

## 2021-10-20 NOTE — TELEPHONE ENCOUNTER
Gloria from [facility] therapy called to check the status of extended order for the pt. Ph [phone] and fax [phone]. Thanks.

## 2021-11-01 ENCOUNTER — OFFICE VISIT (OUTPATIENT)
Dept: URBAN - METROPOLITAN AREA CLINIC 121 | Facility: CLINIC | Age: 81
End: 2021-11-01

## 2021-11-06 NOTE — H&P
Clinic Note [facility]     Assessment/Plan:  79year old male    1. 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 Recommend obtaining an EMG/NCV for further delineation.   I suspect he also has a bra COLONOSCOPY     • HIP REPLACEMENT SURGERY     • OTHER      parotid sx   • REMOVAL GALLBLADDER     • TOTAL HIP REPLACEMENT       Current Outpatient Medications   Medication Sig Dispense Refill   • aspirin 81 MG Oral Tab EC Take 1 tablet (81 mg total) by jamar [address] [facility], [address], [place], [address] [facility] org  [email or web address]  t: [id number]  f: [phone]

## 2021-11-08 ENCOUNTER — OFFICE VISIT (OUTPATIENT)
Dept: ORTHOPEDICS CLINIC | Facility: CLINIC | Age: 81
End: 2021-11-08
Payer: MEDICARE

## 2021-11-08 VITALS — OXYGEN SATURATION: 97 % | HEART RATE: 76 BPM

## 2021-11-08 DIAGNOSIS — G56.12 MEDIAN NERVE DYSFUNCTION, LEFT: Primary | ICD-10-CM

## 2021-11-08 PROCEDURE — 99213 OFFICE O/P EST LOW 20 MIN: CPT | Performed by: ORTHOPAEDIC SURGERY

## 2021-11-08 NOTE — PROGRESS NOTES
Clinic Note [facility]     Assessment/Plan:  79year old male    1. Left brachial plexopathy with more significant median nerve involvement–patient is leaving for [place] in a few days. Recommended the patient see a brachial plexus surgeon.   I daniel This 79year old right hand dominant male presents with left hand dysfunction. Patient is unsure if the dysfunction started after his shoulder dislocation or after his reverse shoulder arthroplasty.   He is dense numbness in the median nerve distribution a history.   Social History    Occupational History      Not on file    Tobacco Use      Smoking status: Former Smoker        Packs/day: 0.00      Smokeless tobacco: Never Used      Tobacco comment: Quit 40 yrs ago    Vaping Use      Vaping Use: Never used

## 2021-12-09 ENCOUNTER — OFFICE VISIT (OUTPATIENT)
Dept: URBAN - METROPOLITAN AREA CLINIC 63 | Facility: CLINIC | Age: 81
End: 2021-12-09

## 2022-01-03 ENCOUNTER — OFFICE VISIT (OUTPATIENT)
Dept: URBAN - METROPOLITAN AREA SURGERY CENTER 4 | Facility: SURGERY CENTER | Age: 82
End: 2022-01-03

## 2022-01-05 LAB — PATHOLOGY (INDENTED REPORT): (no result)

## 2022-01-20 ENCOUNTER — OFFICE VISIT (OUTPATIENT)
Dept: URBAN - METROPOLITAN AREA CLINIC 63 | Facility: CLINIC | Age: 82
End: 2022-01-20

## 2022-04-02 ASSESSMENT — VISUAL ACUITY
OD_GLARE: 20/400
OS_CC: 20/50+2
OS_PH: SC 20/30
OD_PH: SC 20/30
OD_CC: 20/50
OS_GLARE: 20/400

## 2022-04-02 ASSESSMENT — TONOMETRY
OD_IOP_MMHG: 15
OS_IOP_MMHG: 17

## 2022-04-27 ENCOUNTER — OFFICE VISIT (OUTPATIENT)
Dept: ORTHOPEDICS CLINIC | Facility: CLINIC | Age: 82
End: 2022-04-27
Payer: MEDICARE

## 2022-04-27 VITALS — HEART RATE: 76 BPM | OXYGEN SATURATION: 96 %

## 2022-04-27 DIAGNOSIS — Z96.612 STATUS POST REPLACEMENT OF LEFT SHOULDER JOINT: Primary | ICD-10-CM

## 2022-04-27 PROCEDURE — 99213 OFFICE O/P EST LOW 20 MIN: CPT | Performed by: ORTHOPAEDIC SURGERY

## 2022-05-13 ENCOUNTER — OFFICE VISIT (OUTPATIENT)
Dept: ORTHOPEDICS CLINIC | Facility: CLINIC | Age: 82
End: 2022-05-13
Payer: MEDICARE

## 2022-05-13 VITALS — BODY MASS INDEX: 29.35 KG/M2 | WEIGHT: 205 LBS | HEIGHT: 70 IN

## 2022-05-13 DIAGNOSIS — G56.12 MEDIAN NERVE DYSFUNCTION, LEFT: Primary | ICD-10-CM

## 2022-05-13 PROCEDURE — 99213 OFFICE O/P EST LOW 20 MIN: CPT | Performed by: ORTHOPAEDIC SURGERY

## 2022-05-17 ENCOUNTER — OFFICE VISIT (OUTPATIENT)
Dept: OCCUPATIONAL MEDICINE | Age: 82
End: 2022-05-17
Attending: ORTHOPAEDIC SURGERY
Payer: MEDICARE

## 2022-05-17 DIAGNOSIS — G56.12 MEDIAN NERVE DYSFUNCTION, LEFT: ICD-10-CM

## 2022-05-17 PROCEDURE — 97166 OT EVAL MOD COMPLEX 45 MIN: CPT

## 2022-05-17 PROCEDURE — 97110 THERAPEUTIC EXERCISES: CPT

## 2022-05-19 ENCOUNTER — APPOINTMENT (OUTPATIENT)
Dept: OCCUPATIONAL MEDICINE | Age: 82
End: 2022-05-19
Attending: ORTHOPAEDIC SURGERY
Payer: MEDICARE

## 2022-05-24 ENCOUNTER — APPOINTMENT (OUTPATIENT)
Dept: OCCUPATIONAL MEDICINE | Age: 82
End: 2022-05-24
Attending: ORTHOPAEDIC SURGERY
Payer: MEDICARE

## 2022-06-02 ENCOUNTER — APPOINTMENT (OUTPATIENT)
Dept: OCCUPATIONAL MEDICINE | Age: 82
End: 2022-06-02
Attending: ORTHOPAEDIC SURGERY
Payer: MEDICARE

## 2022-06-07 ENCOUNTER — APPOINTMENT (OUTPATIENT)
Dept: OCCUPATIONAL MEDICINE | Age: 82
End: 2022-06-07
Attending: ORTHOPAEDIC SURGERY
Payer: MEDICARE

## 2022-06-09 ENCOUNTER — APPOINTMENT (OUTPATIENT)
Dept: OCCUPATIONAL MEDICINE | Age: 82
End: 2022-06-09
Attending: ORTHOPAEDIC SURGERY
Payer: MEDICARE

## 2022-06-10 ENCOUNTER — TELEPHONE (OUTPATIENT)
Dept: PHYSICAL THERAPY | Facility: HOSPITAL | Age: 82
End: 2022-06-10

## 2022-06-14 ENCOUNTER — APPOINTMENT (OUTPATIENT)
Dept: OCCUPATIONAL MEDICINE | Age: 82
End: 2022-06-14
Attending: ORTHOPAEDIC SURGERY
Payer: MEDICARE

## 2022-06-16 ENCOUNTER — APPOINTMENT (OUTPATIENT)
Dept: OCCUPATIONAL MEDICINE | Age: 82
End: 2022-06-16
Attending: ORTHOPAEDIC SURGERY
Payer: MEDICARE

## 2022-06-28 ENCOUNTER — TELEPHONE (OUTPATIENT)
Dept: PHYSICAL THERAPY | Age: 82
End: 2022-06-28

## 2022-06-30 ENCOUNTER — APPOINTMENT (OUTPATIENT)
Dept: OCCUPATIONAL MEDICINE | Age: 82
End: 2022-06-30
Payer: MEDICARE

## 2022-07-09 ENCOUNTER — TELEPHONE ENCOUNTER (OUTPATIENT)
Dept: URBAN - METROPOLITAN AREA CLINIC 121 | Facility: CLINIC | Age: 82
End: 2022-07-09

## 2022-07-10 ENCOUNTER — TELEPHONE ENCOUNTER (OUTPATIENT)
Dept: URBAN - METROPOLITAN AREA CLINIC 121 | Facility: CLINIC | Age: 82
End: 2022-07-10

## 2022-07-10 RX ORDER — CHOLESTYRAMINE 4 G/9G
ONCE A DAY IN 8 OZ OF WATER POWDER, FOR SUSPENSION ORAL ONCE A DAY
Refills: 3 | Status: ACTIVE | COMMUNITY
Start: 2022-01-20

## 2022-07-10 RX ORDER — EMPAGLIFLOZIN 25 MG/1
TABLET, FILM COATED ORAL ONCE A DAY
Refills: 0 | Status: ACTIVE | COMMUNITY
Start: 2022-01-20

## 2022-07-10 RX ORDER — AMITRIPTYLINE HYDROCHLORIDE 50 MG/1
TABLET, FILM COATED ORAL ONCE A DAY
Refills: 0 | Status: ACTIVE | COMMUNITY
Start: 2022-01-20

## 2022-07-10 RX ORDER — METFORMIN HCL 1000 MG/1
TABLET ORAL TWICE A DAY
Refills: 0 | Status: ACTIVE | COMMUNITY
Start: 2022-01-20

## 2022-07-10 RX ORDER — ATORVASTATIN CALCIUM 40 MG/1
TABLET, FILM COATED ORAL ONCE A DAY
Refills: 0 | Status: ACTIVE | COMMUNITY
Start: 2022-01-20

## 2022-07-10 RX ORDER — PIOGLITAZONE 30 MG/1
TABLET ORAL ONCE A DAY
Refills: 0 | Status: ACTIVE | COMMUNITY
Start: 2022-01-20

## 2022-07-15 ENCOUNTER — OFFICE VISIT (OUTPATIENT)
Dept: OCCUPATIONAL MEDICINE | Age: 82
End: 2022-07-15
Attending: ORTHOPAEDIC SURGERY
Payer: MEDICARE

## 2022-07-15 PROCEDURE — 97110 THERAPEUTIC EXERCISES: CPT

## 2022-08-22 ENCOUNTER — OFFICE VISIT (OUTPATIENT)
Dept: ORTHOPEDICS CLINIC | Facility: CLINIC | Age: 82
End: 2022-08-22
Payer: MEDICARE

## 2022-08-22 VITALS — HEIGHT: 70 IN | BODY MASS INDEX: 29.35 KG/M2 | WEIGHT: 205 LBS

## 2022-08-22 DIAGNOSIS — G56.02 CARPAL TUNNEL SYNDROME OF LEFT WRIST: Primary | ICD-10-CM

## 2022-08-22 PROCEDURE — 99214 OFFICE O/P EST MOD 30 MIN: CPT | Performed by: ORTHOPAEDIC SURGERY

## 2022-08-22 RX ORDER — TRAZODONE HYDROCHLORIDE 50 MG/1
50 TABLET ORAL NIGHTLY
COMMUNITY
Start: 2022-07-20

## 2022-08-31 ENCOUNTER — APPOINTMENT (OUTPATIENT)
Dept: GENERAL RADIOLOGY | Age: 82
End: 2022-08-31
Attending: NURSE PRACTITIONER
Payer: MEDICARE

## 2022-08-31 ENCOUNTER — HOSPITAL ENCOUNTER (OUTPATIENT)
Age: 82
Discharge: HOME | End: 2022-08-31
Attending: EMERGENCY MEDICINE
Payer: MEDICARE

## 2022-08-31 VITALS
HEIGHT: 70 IN | DIASTOLIC BLOOD PRESSURE: 65 MMHG | WEIGHT: 200 LBS | HEART RATE: 73 BPM | RESPIRATION RATE: 18 BRPM | TEMPERATURE: 98 F | OXYGEN SATURATION: 96 % | BODY MASS INDEX: 28.63 KG/M2 | SYSTOLIC BLOOD PRESSURE: 112 MMHG

## 2022-08-31 DIAGNOSIS — J06.9 VIRAL URI: Primary | ICD-10-CM

## 2022-08-31 PROCEDURE — 99213 OFFICE O/P EST LOW 20 MIN: CPT

## 2022-08-31 PROCEDURE — 71046 X-RAY EXAM CHEST 2 VIEWS: CPT | Performed by: NURSE PRACTITIONER

## 2022-08-31 RX ORDER — BENZONATATE 100 MG/1
100 CAPSULE ORAL 3 TIMES DAILY PRN
Qty: 30 CAPSULE | Refills: 0 | Status: SHIPPED | OUTPATIENT
Start: 2022-08-31 | End: 2022-09-10 | Stop reason: ALTCHOICE

## 2022-08-31 NOTE — ED INITIAL ASSESSMENT (HPI)
Cough - started Saturday. Denies  Fever. Pt tested + covid on Augurst 1 . Pt is vaccinated. Per pt just saw Eye doctor yesterday and dilated both eye.

## 2022-09-10 ENCOUNTER — HOSPITAL ENCOUNTER (OUTPATIENT)
Age: 82
Discharge: HOME | End: 2022-09-10
Payer: MEDICARE

## 2022-09-10 VITALS
RESPIRATION RATE: 20 BRPM | SYSTOLIC BLOOD PRESSURE: 121 MMHG | DIASTOLIC BLOOD PRESSURE: 66 MMHG | WEIGHT: 205 LBS | HEART RATE: 89 BPM | BODY MASS INDEX: 29.35 KG/M2 | TEMPERATURE: 98 F | HEIGHT: 70 IN | OXYGEN SATURATION: 97 %

## 2022-09-10 DIAGNOSIS — H61.23 BILATERAL IMPACTED CERUMEN: Primary | ICD-10-CM

## 2022-09-10 PROCEDURE — 69210 REMOVE IMPACTED EAR WAX UNI: CPT

## 2022-09-10 PROCEDURE — 69210 REMOVE IMPACTED EAR WAX UNI: CPT | Performed by: PHYSICIAN ASSISTANT

## 2022-09-10 NOTE — ED INITIAL ASSESSMENT (HPI)
C/o both ears plugged. Was told by [address] [place] [address] on Thursday need to have ear cleaned due to ear wax build up. No pain.

## 2022-09-21 ENCOUNTER — PROCEDURE VISIT (OUTPATIENT)
Dept: PHYSICAL MEDICINE AND REHAB | Facility: CLINIC | Age: 82
End: 2022-09-21

## 2022-09-21 DIAGNOSIS — G56.02 CARPAL TUNNEL SYNDROME OF LEFT WRIST: ICD-10-CM

## 2022-09-21 PROCEDURE — 95886 MUSC TEST DONE W/N TEST COMP: CPT | Performed by: PHYSICAL MEDICINE & REHABILITATION

## 2022-09-21 PROCEDURE — 95909 NRV CNDJ TST 5-6 STUDIES: CPT | Performed by: PHYSICAL MEDICINE & REHABILITATION

## 2022-09-27 ENCOUNTER — TELEPHONE (OUTPATIENT)
Dept: ORTHOPEDICS CLINIC | Facility: CLINIC | Age: 82
End: 2022-09-27

## 2022-09-27 NOTE — TELEPHONE ENCOUNTER
Patient is asking to speak with Dr. Randy Schuster about his EMG test.  Please call patient on his cell at [phone].

## 2022-10-03 NOTE — TELEPHONE ENCOUNTER
Attempted to reach patients wife, no answer, left message on identifying voicemail,. that Dr Nicole Peres will be contacting them regarding test results when hes back in the office    Madie Nicole Dr wanted to talk to this patient.  We can let them know that he is out and will return the call once he is back     Thanks

## 2022-10-06 ENCOUNTER — TELEPHONE (OUTPATIENT)
Dept: ORTHOPEDICS CLINIC | Facility: CLINIC | Age: 82
End: 2022-10-06

## 2022-10-06 DIAGNOSIS — G56.02 CARPAL TUNNEL SYNDROME OF LEFT WRIST: Primary | ICD-10-CM

## 2022-10-06 NOTE — TELEPHONE ENCOUNTER
Patients wife is calling to schedule surgery. States patient spoke to Dr Odessia Burkitt yesterday and they discussed surgery for 10/12/22.     Please advise

## 2022-10-06 NOTE — TELEPHONE ENCOUNTER
Called and spoke with Julieth Raphael in regards to getting him scheduled for surgery. I initially spoke with his wife Corky James who states that she gave the office a call earlier and wanted to get her  scheduled. I did let her know that I would not be able to speak with her due tho her not being listed as someone whom I can speak with in regards to him. She was upset about this but she did place Julieth Raphael on the phone. He states that he was told that his surgical date is supposed to be 10/12/22. I did proceed to try to explain to him the procedure with surgery. He states that he was under the impression that the procedure was done in the office. I did let him know that the procedure would be done at the hospital. He stated no one has communicated with him what time his surgery would be. I did let him know that he would not know the time of his surgery until the day before surgery and the hospital would reach out to him to let him know exactly is surgery would be. I proceeded to try to finish explaining what is needed of him prior to surgery and he stated that he would deal with it when the hospital calls him.

## 2022-10-06 NOTE — TELEPHONE ENCOUNTER
[address] [place] [address] SHEET   Name: Morales Hollins  MRN: [id number]   : 1940  Surgical Consent:  Left carpal tunnel release  Diagnosis:   Carpal tunnel syndrome of left wrist [G56.02]  Laterality:   Left    Procedure Codes:  Carpal Tunnel Release Open (41722)  Disposition:  Outpatient  Operative Time:  45 mins  Antibiotics:  Ancef  Anesthesia Type:  Local, PIV Insertion, LR 20mL/hr  Clearance:   NONE  Equipment:  [place] Blade, Lead Hand or Local 1% lidocaine WITH epi, 0.5% marcaine - 1:1 mix  Positioning:  Supine with arm table on transport cart  Assistant request:   Assistant: Rosio Doss PA-C  Anesthesiologist Request:   None    Follow Up    7-10 days post op with Steve Portillo    Other:     Do not shave or clip arm hair

## 2022-10-07 NOTE — TELEPHONE ENCOUNTER
No prior auth or pre-cert required for OP CPT CODE 44517  - medicare- for tracking purposes only  Medicare is active and no auth is required  Procedure not on auth list     [email or web address]

## 2022-10-12 ENCOUNTER — HOSPITAL ENCOUNTER (OUTPATIENT)
Facility: HOSPITAL | Age: 82
Setting detail: HOSPITAL OUTPATIENT SURGERY
Discharge: HOME | End: 2022-10-12
Attending: ORTHOPAEDIC SURGERY | Admitting: ORTHOPAEDIC SURGERY
Payer: MEDICARE

## 2022-10-12 VITALS
SYSTOLIC BLOOD PRESSURE: 115 MMHG | DIASTOLIC BLOOD PRESSURE: 58 MMHG | RESPIRATION RATE: 18 BRPM | HEART RATE: 73 BPM | HEIGHT: 70 IN | WEIGHT: 207.81 LBS | OXYGEN SATURATION: 95 % | BODY MASS INDEX: 29.75 KG/M2 | TEMPERATURE: 98 F

## 2022-10-12 DIAGNOSIS — G56.02 CARPAL TUNNEL SYNDROME OF LEFT WRIST: ICD-10-CM

## 2022-10-12 PROCEDURE — 01N50ZZ RELEASE MEDIAN NERVE, OPEN APPROACH: ICD-10-PCS | Performed by: ORTHOPAEDIC SURGERY

## 2022-10-12 RX ORDER — BUPIVACAINE HYDROCHLORIDE 5 MG/ML
INJECTION, SOLUTION EPIDURAL; INTRACAUDAL AS NEEDED
Status: DISCONTINUED | OUTPATIENT
Start: 2022-10-12 | End: 2022-10-12 | Stop reason: HOSPADM

## 2022-10-12 RX ORDER — HYDROCODONE BITARTRATE AND ACETAMINOPHEN 5; 325 MG/1; MG/1
1 TABLET ORAL EVERY 6 HOURS PRN
Qty: 5 TABLET | Refills: 0 | Status: SHIPPED | OUTPATIENT
Start: 2022-10-12

## 2022-10-12 RX ORDER — SODIUM CHLORIDE, SODIUM LACTATE, POTASSIUM CHLORIDE, CALCIUM CHLORIDE 600; 310; 30; 20 MG/100ML; MG/100ML; MG/100ML; MG/100ML
INJECTION, SOLUTION INTRAVENOUS CONTINUOUS
Status: DISCONTINUED | OUTPATIENT
Start: 2022-10-12 | End: 2022-10-12

## 2022-10-12 RX ORDER — CEFAZOLIN SODIUM/WATER 2 G/20 ML
2 SYRINGE (ML) INTRAVENOUS ONCE
Status: DISCONTINUED | OUTPATIENT
Start: 2022-10-12 | End: 2022-10-12 | Stop reason: HOSPADM

## 2022-10-12 RX ORDER — BLOOD-GLUCOSE METER
1 EACH MISCELLANEOUS 2 TIMES DAILY
COMMUNITY
Start: 2022-09-22

## 2022-10-12 RX ORDER — LIDOCAINE HYDROCHLORIDE AND EPINEPHRINE 10; 10 MG/ML; UG/ML
INJECTION, SOLUTION INFILTRATION; PERINEURAL AS NEEDED
Status: DISCONTINUED | OUTPATIENT
Start: 2022-10-12 | End: 2022-10-12 | Stop reason: HOSPADM

## 2022-10-12 NOTE — OPERATIVE REPORT
Operative Note    Patient Name: Scott Ortega    Preoperative Diagnosis:     1. Carpal tunnel syndrome of left wrist [G56.02]    Postoperative Diagnosis:     1. Carpal tunnel syndrome of left wrist [G56.02]    Surgeon(s) and Role:     Shannon Freedman MD - Primary     Assistant: PA: MICHAEL Harrison    Procedures:     1. Left carpal tunnel release (26501)    Antibiotics: Ancef 2g    Surgical Findings: normal anatomy    Anesthesia: Local    Complications: None    Implants: None    Specimen: None    Condition: Stable    Estimated Blood Loss: 1mL    Indications:  79year old male with EMG/NCV confirmed left carpal tunnel syndrome that failed non-surgical management. The risks associated with the procedure, expected  outcome, the expected time to recovery, and the rehab required were reviewed. Any patient's questions were answered. Procedure:  Patient was met in the preoperative holding area where consent was verified, laterality was marked with the surgeon's initials, and the H&P was updated. Patient was brought to the operating room on a transport cart. Patient was placed in supine position. SCDs were placed. An upper arm tourniquet was placed. Local (1% lidocaine with epi, 0.5% Marcaine) was infiltrated in the distal forearm and along the incision site of the carpal tunnel release. The extremity was prepped and draped in usual sterile fashion. A surgical timeout was performed. The limb was elevated and exsanguinated using Esmarch. Tourniquet was raised to 250 mmHg. A 15 blade was used to make an incision along the radial border of the ring finger with the distal extent being the hook of the hamate and the proximal extent being the pisiform. 15 blade was used to make an incision through the dermis. A Ray-González was used to perform blunt dissection onto the palmar fascia. Palmar fascia was incised longitudinally.   Blunt dissection was once again used to identify the transverse carpal ligament which was then divided longitudinally along the entirety of its length. A tight carpal tunnel was noted. The soft tissue was dissected off the proximal edge of transverse carpal ligament and antebrachial fascia superficial and deep to it using PATTI Costello. Burch scissors was used to perform a push cut to release at. The wound was irrigated with sterile normal saline. Tourniquet was let down and bipolar cautery was used to achieve hemostasis. Closure: The incision was then closed with 4-0 nylon in a horizontal mattress fashion. Dressing/Splint:  Bacitracin, 4 x 4 gauze, and a loosely wrapped Ace wrap was used to hold the dressing in place. Post Operative:  Patient was woken up from anesthesia and taken to PACU for further recovery and discharge home. Slime Phillips MD  Hand, Wrist, & Elbow Surgery  [facility]  [facility], [address]  [email or web address]  t: [id number]  f: [phone]

## 2022-10-20 ENCOUNTER — OFFICE VISIT (OUTPATIENT)
Dept: ORTHOPEDICS CLINIC | Facility: CLINIC | Age: 82
End: 2022-10-20
Payer: MEDICARE

## 2022-10-20 VITALS — BODY MASS INDEX: 29.63 KG/M2 | WEIGHT: 207 LBS | HEIGHT: 70 IN

## 2022-10-20 DIAGNOSIS — G56.02 CARPAL TUNNEL SYNDROME OF LEFT WRIST: Primary | ICD-10-CM

## 2022-10-20 PROCEDURE — 99024 POSTOP FOLLOW-UP VISIT: CPT | Performed by: PHYSICIAN ASSISTANT

## 2022-10-27 ENCOUNTER — OFFICE VISIT (OUTPATIENT)
Dept: ORTHOPEDICS CLINIC | Facility: CLINIC | Age: 82
End: 2022-10-27
Payer: MEDICARE

## 2022-10-27 VITALS — WEIGHT: 207 LBS | BODY MASS INDEX: 29.63 KG/M2 | HEIGHT: 70 IN

## 2022-10-27 DIAGNOSIS — G56.02 CARPAL TUNNEL SYNDROME OF LEFT WRIST: ICD-10-CM

## 2022-10-27 DIAGNOSIS — M65.342 TRIGGER RING FINGER OF LEFT HAND: Primary | ICD-10-CM

## 2022-10-27 PROCEDURE — 20550 NJX 1 TENDON SHEATH/LIGAMENT: CPT | Performed by: PHYSICIAN ASSISTANT

## 2022-10-27 PROCEDURE — 99213 OFFICE O/P EST LOW 20 MIN: CPT | Performed by: PHYSICIAN ASSISTANT

## 2022-10-27 RX ORDER — TRIAMCINOLONE ACETONIDE 40 MG/ML
40 INJECTION, SUSPENSION INTRA-ARTICULAR; INTRAMUSCULAR ONCE
Status: COMPLETED | OUTPATIENT
Start: 2022-10-27 | End: 2022-10-27

## 2022-10-27 RX ADMIN — TRIAMCINOLONE ACETONIDE 40 MG: 40 INJECTION, SUSPENSION INTRA-ARTICULAR; INTRAMUSCULAR at 10:31:00

## 2023-05-02 ENCOUNTER — HOSPITAL ENCOUNTER (OUTPATIENT)
Age: 83
Discharge: HOME | End: 2023-05-02
Payer: MEDICARE

## 2023-05-02 ENCOUNTER — APPOINTMENT (OUTPATIENT)
Dept: GENERAL RADIOLOGY | Age: 83
End: 2023-05-02
Attending: PHYSICIAN ASSISTANT
Payer: MEDICARE

## 2023-05-02 ENCOUNTER — APPOINTMENT (OUTPATIENT)
Dept: CT IMAGING | Age: 83
End: 2023-05-02
Attending: PHYSICIAN ASSISTANT
Payer: MEDICARE

## 2023-05-02 VITALS
HEART RATE: 90 BPM | SYSTOLIC BLOOD PRESSURE: 127 MMHG | DIASTOLIC BLOOD PRESSURE: 85 MMHG | RESPIRATION RATE: 22 BRPM | OXYGEN SATURATION: 94 % | TEMPERATURE: 98 F
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
## 2023-05-02 DIAGNOSIS — S49.91XA INJURY OF RIGHT SHOULDER, INITIAL ENCOUNTER: Primary | ICD-10-CM

## 2023-05-02 PROCEDURE — 70450 CT HEAD/BRAIN W/O DYE: CPT | Performed by: PHYSICIAN ASSISTANT

## 2023-05-02 PROCEDURE — 99214 OFFICE O/P EST MOD 30 MIN: CPT

## 2023-05-02 PROCEDURE — 99213 OFFICE O/P EST LOW 20 MIN: CPT

## 2023-05-02 PROCEDURE — 73030 X-RAY EXAM OF SHOULDER: CPT | Performed by: PHYSICIAN ASSISTANT

## 2023-05-02 RX ORDER — DOXEPIN HYDROCHLORIDE 25 MG/1
25 CAPSULE ORAL NIGHTLY
COMMUNITY

## 2023-05-02 RX ORDER — TIMOLOL MALEATE 5 MG/ML
1 SOLUTION/ DROPS OPHTHALMIC 2 TIMES DAILY
COMMUNITY

## 2023-05-02 NOTE — ED INITIAL ASSESSMENT (HPI)
Mechanical fall around 0830 falling onto carpet. C/o right shoulder pain. Denies head injury, LOC, blood thinners.

## 2023-05-02 NOTE — DISCHARGE INSTRUCTIONS
Wear sling. Continue naproxen. Continue range of motion exercises. Please call orthopedic specialty to arrange follow-up. Remove sling at night.

## 2023-05-08 ENCOUNTER — OFFICE VISIT (OUTPATIENT)
Dept: ORTHOPEDICS CLINIC | Facility: CLINIC | Age: 83
End: 2023-05-08
Payer: MEDICARE

## 2023-05-08 VITALS — WEIGHT: 207 LBS | BODY MASS INDEX: 30 KG/M2

## 2023-05-08 DIAGNOSIS — M19.019 GLENOHUMERAL ARTHRITIS: ICD-10-CM

## 2023-05-08 DIAGNOSIS — S46.001A INJURY OF RIGHT ROTATOR CUFF, INITIAL ENCOUNTER: Primary | ICD-10-CM

## 2023-05-08 PROCEDURE — 99214 OFFICE O/P EST MOD 30 MIN: CPT | Performed by: ORTHOPAEDIC SURGERY

## 2023-05-08 PROCEDURE — 20610 DRAIN/INJ JOINT/BURSA W/O US: CPT | Performed by: ORTHOPAEDIC SURGERY

## 2023-05-08 RX ORDER — KETOROLAC TROMETHAMINE 30 MG/ML
30 INJECTION, SOLUTION INTRAMUSCULAR; INTRAVENOUS ONCE
Status: COMPLETED | OUTPATIENT
Start: 2023-05-08 | End: 2023-05-08

## 2023-05-08 RX ORDER — TRIAMCINOLONE ACETONIDE 40 MG/ML
40 INJECTION, SUSPENSION INTRA-ARTICULAR; INTRAMUSCULAR ONCE
Status: COMPLETED | OUTPATIENT
Start: 2023-05-08 | End: 2023-05-08

## 2023-05-08 RX ADMIN — TRIAMCINOLONE ACETONIDE 40 MG: 40 INJECTION, SUSPENSION INTRA-ARTICULAR; INTRAMUSCULAR at 10:20:00

## 2023-05-08 RX ADMIN — KETOROLAC TROMETHAMINE 30 MG: 30 INJECTION, SOLUTION INTRAMUSCULAR; INTRAVENOUS at 10:20:00

## 2023-05-08 NOTE — PROCEDURES
Right Shoulder Glenohumeral Joint Injection    Name: Macario Weaver   MRN: [id number]  Date: 5/8/2023     Clinical Indications:   Persistent Shoulder pain refractory to conservative measures. After informed consent, the injection site was marked, sterilized with topical chlorhexidine antiseptic, and locally anesthetized with skin refrigerant. The patient was seated upright and the shoulder was exposed. Using sterile technique: 1 mL of 30mg/mL of Ketorolac, 2 mL of 0.5% Bupivicaine, 2 mL of 1% Lidocaine, and 1 mg of 40mg/mL of Triamcinolone (Kenalog) was injected with a Anterior approach utilizing a 22 gauge needle. A band-aid was applied. The patient tolerated the procedure well. June Smith. Edil Duran MD  Knee, Shoulder, & Elbow Surgery / Sports Medicine Specialist  [facility]  [address]. [email or web address] [email or web address]  t: [phone]  o: [phone]  f: [phone]

## 2023-05-15 ENCOUNTER — OFFICE VISIT (OUTPATIENT)
Dept: ORTHOPEDICS CLINIC | Facility: CLINIC | Age: 83
End: 2023-05-15
Payer: MEDICARE

## 2023-05-15 VITALS — WEIGHT: 207 LBS | HEIGHT: 70 IN | BODY MASS INDEX: 29.63 KG/M2

## 2023-05-15 DIAGNOSIS — G56.12 MEDIAN NERVE DYSFUNCTION, LEFT: Primary | ICD-10-CM

## 2023-05-15 PROCEDURE — 99213 OFFICE O/P EST LOW 20 MIN: CPT | Performed by: ORTHOPAEDIC SURGERY

## 2023-05-15 RX ORDER — LATANOPROST 50 UG/ML
SOLUTION/ DROPS OPHTHALMIC
COMMUNITY
Start: 2023-04-27

## 2023-06-02 ENCOUNTER — OFFICE VISIT (OUTPATIENT)
Dept: SLEEP CENTER | Age: 83
End: 2023-06-02
Attending: Other
Payer: MEDICARE

## 2023-06-02 DIAGNOSIS — G47.00 INSOMNIA: ICD-10-CM

## 2023-06-02 DIAGNOSIS — Z72.820 POOR SLEEP: ICD-10-CM

## 2023-06-02 PROCEDURE — 95810 POLYSOM 6/> YRS 4/> PARAM: CPT

## 2023-06-08 ENCOUNTER — SLEEP STUDY (OUTPATIENT)
Facility: CLINIC | Age: 83
End: 2023-06-08
Payer: MEDICARE

## 2023-06-08 DIAGNOSIS — G47.33 OBSTRUCTIVE SLEEP APNEA SYNDROME: Primary | ICD-10-CM

## 2023-06-08 PROCEDURE — 95810 POLYSOM 6/> YRS 4/> PARAM: CPT | Performed by: OTHER

## 2023-06-09 ENCOUNTER — MED REC SCAN ONLY (OUTPATIENT)
Dept: ORTHOPEDICS CLINIC | Facility: CLINIC | Age: 83
End: 2023-06-09

## 2023-06-29 ENCOUNTER — TELEPHONE (OUTPATIENT)
Facility: CLINIC | Age: 83
End: 2023-06-29

## 2023-06-29 DIAGNOSIS — G47.33 OSA (OBSTRUCTIVE SLEEP APNEA): Primary | ICD-10-CM

## 2023-07-10 ENCOUNTER — OFFICE VISIT (OUTPATIENT)
Dept: SLEEP CENTER | Age: 83
End: 2023-07-10
Attending: INTERNAL MEDICINE
Payer: MEDICARE

## 2023-07-10 DIAGNOSIS — R06.81 APNEA: ICD-10-CM

## 2023-07-10 DIAGNOSIS — R06.83 SNORING: ICD-10-CM

## 2023-07-10 DIAGNOSIS — G47.10 HYPERSOMNIA: ICD-10-CM

## 2023-07-10 PROCEDURE — 95811 POLYSOM 6/>YRS CPAP 4/> PARM: CPT

## 2023-07-18 ENCOUNTER — MED REC SCAN ONLY (OUTPATIENT)
Dept: ORTHOPEDICS CLINIC | Facility: CLINIC | Age: 83
End: 2023-07-18

## 2023-07-31 ENCOUNTER — TELEPHONE (OUTPATIENT)
Dept: ORTHOPEDICS CLINIC | Facility: CLINIC | Age: 83
End: 2023-07-31

## 2023-07-31 ENCOUNTER — OFFICE VISIT (OUTPATIENT)
Dept: ORTHOPEDICS CLINIC | Facility: CLINIC | Age: 83
End: 2023-07-31
Payer: MEDICARE

## 2023-07-31 DIAGNOSIS — M12.811 ROTATOR CUFF TEAR ARTHROPATHY OF RIGHT SHOULDER: Primary | ICD-10-CM

## 2023-07-31 DIAGNOSIS — M75.101 ROTATOR CUFF TEAR ARTHROPATHY OF RIGHT SHOULDER: Primary | ICD-10-CM

## 2023-07-31 NOTE — PROGRESS NOTES
OR BOOKING SHEET SHOULDER  Location: [x] Jennifer Correa   [] [address]  Name: Edy Sandhu  MRN: [id number]   : 1940  Diagnos  [x] Rotator cuff tear arthropathy of right shoulder [M75.101, M12.811]  Disposition:    [x] Ambulatory  [] Overnight for MORGAN  [] Overnight for observation and pain control  [] Inpatient procedure    Operative Time Required:  1.5 hours ([facility])  Antibiotics: 2 g cefazolin within 60 minutes of surgical incision  Procedure:   Laterality: [x] RIGHT [] LEFT                  [] BILATERAL  Procedures:   [x] Shoulder Arthroscopy    [x] Rotator Cuff Repair / Subacromial Balloon Spacer (39663)  [x] Extensive Debridement (03809)     Additional info:   [x] PCP Clearance Needed  [] MRSA  [] C-Arm  [x] TXA at time surgery  [x] Physical Therapy External - Ed Carmelina Luna  [x] DME Rx Needed  [] Appt with Dr. Mata Nunez needed  Implants needed: Arthrex, Candor Balloon - Vazquez Fonder  Positioning Equipment: Pedro Cabezas

## 2023-07-31 NOTE — TELEPHONE ENCOUNTER
Date of Surgery: 8/15/2023     Post Op Appt:  2023 930AM    Case ID:  [id number]    Notes: Lets add for 8/15 at [facility], thanks!        OR BOOKING SHEET SHOULDER  Location: [x] [facility]                    [] [facility]  Name: Natasha Ellis  MRN: [id number]   : 1940  Diagnos  [x] Rotator cuff tear arthropathy of right shoulder [M75.101, M12.811]  Disposition:    [x] Ambulatory  [] Overnight for MORGAN  [] Overnight for observation and pain control  [] Inpatient procedure     Operative Time Required:  1.5 hours (EOSC)  Antibiotics: 2 g cefazolin within 60 minutes of surgical incision  Procedure:   Laterality:                  [x] RIGHT                  [] LEFT                   [] BILATERAL  Procedures:                    [x] Shoulder Arthroscopy                                 [x] Rotator Cuff Repair / Subacromial Balloon Spacer (25414)  [x] Extensive Debridement (02744)      Additional info:   [x] PCP Clearance Needed  [] MRSA  [] C-Arm  [x] TXA at time surgery  [x] Physical Therapy External - Ed Liz Gonzalez  [x] DME Rx Needed  [] Appt with Dr. Myles Salazar needed  Implants needed: Arthrex, Jigna Balloon - [employer] Client  Positioning Equipment: Keara Larson

## 2023-07-31 NOTE — TELEPHONE ENCOUNTER
CALLED PATIENT AND WE SCHEDULED SURGERY, SCHEDULED POST OP AND WENT OVER PRE OPERATIVE PROCEDURES. PCP CLEARANCE SENT AND ALL QUESTIONS ANSWERED.

## 2023-08-02 ENCOUNTER — LABORATORY ENCOUNTER (OUTPATIENT)
Dept: LAB | Facility: HOSPITAL | Age: 83
End: 2023-08-02
Attending: ORTHOPAEDIC SURGERY
Payer: MEDICARE

## 2023-08-02 DIAGNOSIS — Z01.818 PRE-OP TESTING: ICD-10-CM

## 2023-08-02 LAB
ANION GAP SERPL CALC-SCNC: 3 MMOL/L (ref 0–18)
BUN BLD-MCNC: 19 MG/DL (ref 7–18)
CALCIUM BLD-MCNC: 9.2 MG/DL (ref 8.5–10.1)
CHLORIDE SERPL-SCNC: 107 MMOL/L (ref 98–112)
CO2 SERPL-SCNC: 29 MMOL/L (ref 21–32)
CREAT BLD-MCNC: 0.87 MG/DL
EGFRCR SERPLBLD CKD-EPI 2021: 86 ML/MIN/1.73M2 (ref 60–?)
FASTING STATUS PATIENT QL REPORTED: YES
GLUCOSE BLD-MCNC: 174 MG/DL (ref 70–99)
OSMOLALITY SERPL CALC.SUM OF ELEC: 294 MOSM/KG (ref 275–295)
POTASSIUM SERPL-SCNC: 4.5 MMOL/L (ref 3.5–5.1)
SODIUM SERPL-SCNC: 139 MMOL/L (ref 136–145)

## 2023-08-02 PROCEDURE — 36415 COLL VENOUS BLD VENIPUNCTURE: CPT

## 2023-08-02 PROCEDURE — 80048 BASIC METABOLIC PNL TOTAL CA: CPT

## 2023-08-02 PROCEDURE — 93010 ELECTROCARDIOGRAM REPORT: CPT | Performed by: INTERNAL MEDICINE

## 2023-08-02 PROCEDURE — 93005 ELECTROCARDIOGRAM TRACING: CPT

## 2023-08-03 ENCOUNTER — ANESTHESIA EVENT (OUTPATIENT)
Dept: SURGERY | Facility: HOSPITAL | Age: 83
End: 2023-08-03
Payer: MEDICARE

## 2023-08-03 LAB
ATRIAL RATE: 70 BPM
P AXIS: -16 DEGREES
P-R INTERVAL: 198 MS
Q-T INTERVAL: 372 MS
QRS DURATION: 76 MS
QTC CALCULATION (BEZET): 401 MS
R AXIS: -6 DEGREES
T AXIS: 19 DEGREES
VENTRICULAR RATE: 70 BPM

## 2023-08-04 NOTE — PAT NURSING NOTE
Chart reviewed by Dr Anita Jeter, anesthesiologist for abnormal EKG. Received an order for Cardiac Clearance. Faxed this request to the surgeon and Received fax confirmation. Telephoned Dr Faizan Godoy office and spoke to [place] and informed of above and requested that the clearance be faxed to the PAT department ASAP. EKG routed to surgeon and PCP.  Pt has appt 8/7/23

## 2023-08-08 ENCOUNTER — TELEPHONE (OUTPATIENT)
Dept: SLEEP CENTER | Age: 83
End: 2023-08-08

## 2023-08-10 ENCOUNTER — MED REC SCAN ONLY (OUTPATIENT)
Dept: ORTHOPEDICS CLINIC | Facility: CLINIC | Age: 83
End: 2023-08-10

## 2023-08-14 RX ORDER — TRAMADOL HYDROCHLORIDE 50 MG/1
TABLET ORAL
Qty: 20 TABLET | Refills: 1 | Status: SHIPPED | OUTPATIENT
Start: 2023-08-14

## 2023-08-14 RX ORDER — ONDANSETRON 4 MG/1
TABLET, FILM COATED ORAL
Qty: 8 TABLET | Refills: 0 | Status: SHIPPED | OUTPATIENT
Start: 2023-08-14

## 2023-08-15 ENCOUNTER — HOSPITAL ENCOUNTER (OUTPATIENT)
Facility: HOSPITAL | Age: 83
Setting detail: HOSPITAL OUTPATIENT SURGERY
Discharge: HOME | End: 2023-08-15
Attending: ORTHOPAEDIC SURGERY | Admitting: ORTHOPAEDIC SURGERY
Payer: MEDICARE

## 2023-08-15 ENCOUNTER — TELEPHONE (OUTPATIENT)
Dept: ORTHOPEDICS CLINIC | Facility: CLINIC | Age: 83
End: 2023-08-15

## 2023-08-15 ENCOUNTER — ANESTHESIA (OUTPATIENT)
Dept: SURGERY | Facility: HOSPITAL | Age: 83
End: 2023-08-15
Payer: MEDICARE

## 2023-08-15 VITALS
SYSTOLIC BLOOD PRESSURE: 141 MMHG | HEART RATE: 66 BPM | DIASTOLIC BLOOD PRESSURE: 63 MMHG | RESPIRATION RATE: 16 BRPM | BODY MASS INDEX: 30.49 KG/M2 | HEIGHT: 70 IN | OXYGEN SATURATION: 94 % | WEIGHT: 213 LBS | TEMPERATURE: 98 F

## 2023-08-15 DIAGNOSIS — M75.101 ROTATOR CUFF TEAR ARTHROPATHY OF RIGHT SHOULDER: Primary | ICD-10-CM

## 2023-08-15 DIAGNOSIS — Z01.818 PRE-OP TESTING: Primary | ICD-10-CM

## 2023-08-15 DIAGNOSIS — M12.811 ROTATOR CUFF TEAR ARTHROPATHY OF RIGHT SHOULDER: Primary | ICD-10-CM

## 2023-08-15 LAB
GLUCOSE BLD-MCNC: 229 MG/DL (ref 70–99)
GLUCOSE BLD-MCNC: 244 MG/DL (ref 70–99)

## 2023-08-15 PROCEDURE — 82962 GLUCOSE BLOOD TEST: CPT

## 2023-08-15 PROCEDURE — 0RUJ4JZ SUPPLEMENT RIGHT SHOULDER JOINT WITH SYNTHETIC SUBSTITUTE, PERCUTANEOUS ENDOSCOPIC APPROACH: ICD-10-PCS | Performed by: ORTHOPAEDIC SURGERY

## 2023-08-15 PROCEDURE — 0RBJ4ZZ EXCISION OF RIGHT SHOULDER JOINT, PERCUTANEOUS ENDOSCOPIC APPROACH: ICD-10-PCS | Performed by: ORTHOPAEDIC SURGERY

## 2023-08-15 DEVICE — INSPACE™ SYSTEM, MEDIUMTHE INSPACE™ SUBACROMIAL TISSUE SPACER SYSTEM IS A SHOULDER SPACER FOR MASSIVE IRREPARABLE ROTATOR CUFF TEARS, RESORBABLE, INFLATABLE, NON-FIXED.THE INSPACE™ SUBACROMIAL TISSUE SPACER SYSTEM IMPLANT IS DESIGNED TO ACT AS A SPACER, CREATING A PHYSICAL BARRIER BETWEEN TISSUES IN THE SUBACROMIAL SPACE.  IT IS SUPPLIED STERILE (ETO) AND INTENDED FOR SINGLE USE.
Type: IMPLANTABLE DEVICE | Site: SHOULDER | Status: FUNCTIONAL
Brand: INSPACE™

## 2023-08-15 RX ORDER — HYDROMORPHONE HYDROCHLORIDE 1 MG/ML
0.2 INJECTION, SOLUTION INTRAMUSCULAR; INTRAVENOUS; SUBCUTANEOUS EVERY 5 MIN PRN
Status: DISCONTINUED | OUTPATIENT
Start: 2023-08-15 | End: 2023-08-15

## 2023-08-15 RX ORDER — MIDAZOLAM HYDROCHLORIDE 1 MG/ML
1 INJECTION INTRAMUSCULAR; INTRAVENOUS EVERY 5 MIN PRN
Status: DISCONTINUED | OUTPATIENT
Start: 2023-08-15 | End: 2023-08-15

## 2023-08-15 RX ORDER — DEXTROSE MONOHYDRATE 25 G/50ML
50 INJECTION, SOLUTION INTRAVENOUS
Status: DISCONTINUED | OUTPATIENT
Start: 2023-08-15 | End: 2023-08-15

## 2023-08-15 RX ORDER — METOCLOPRAMIDE HYDROCHLORIDE 5 MG/ML
10 INJECTION INTRAMUSCULAR; INTRAVENOUS EVERY 8 HOURS PRN
Status: DISCONTINUED | OUTPATIENT
Start: 2023-08-15 | End: 2023-08-15

## 2023-08-15 RX ORDER — ACETAMINOPHEN 500 MG
1000 TABLET ORAL ONCE AS NEEDED
Status: DISCONTINUED | OUTPATIENT
Start: 2023-08-15 | End: 2023-08-15

## 2023-08-15 RX ORDER — CEFAZOLIN SODIUM/WATER 2 G/20 ML
2 SYRINGE (ML) INTRAVENOUS ONCE
Status: COMPLETED | OUTPATIENT
Start: 2023-08-15 | End: 2023-08-15

## 2023-08-15 RX ORDER — NICOTINE POLACRILEX 4 MG
15 LOZENGE BUCCAL
Status: DISCONTINUED | OUTPATIENT
Start: 2023-08-15 | End: 2023-08-15

## 2023-08-15 RX ORDER — CEFAZOLIN SODIUM/WATER 2 G/20 ML
SYRINGE (ML) INTRAVENOUS
Status: DISCONTINUED
Start: 2023-08-15 | End: 2023-08-15

## 2023-08-15 RX ORDER — KETOROLAC TROMETHAMINE 30 MG/ML
INJECTION, SOLUTION INTRAMUSCULAR; INTRAVENOUS AS NEEDED
Status: DISCONTINUED | OUTPATIENT
Start: 2023-08-15 | End: 2023-08-15 | Stop reason: SURG

## 2023-08-15 RX ORDER — LABETALOL HYDROCHLORIDE 5 MG/ML
5 INJECTION, SOLUTION INTRAVENOUS EVERY 5 MIN PRN
Status: DISCONTINUED | OUTPATIENT
Start: 2023-08-15 | End: 2023-08-15

## 2023-08-15 RX ORDER — INSULIN ASPART 100 [IU]/ML
INJECTION, SOLUTION INTRAVENOUS; SUBCUTANEOUS ONCE
Status: COMPLETED | OUTPATIENT
Start: 2023-08-15 | End: 2023-08-15

## 2023-08-15 RX ORDER — HYDROCODONE BITARTRATE AND ACETAMINOPHEN 10; 325 MG/1; MG/1
2 TABLET ORAL ONCE AS NEEDED
Status: DISCONTINUED | OUTPATIENT
Start: 2023-08-15 | End: 2023-08-15

## 2023-08-15 RX ORDER — HYDRALAZINE HYDROCHLORIDE 20 MG/ML
INJECTION INTRAMUSCULAR; INTRAVENOUS AS NEEDED
Status: DISCONTINUED | OUTPATIENT
Start: 2023-08-15 | End: 2023-08-15 | Stop reason: SURG

## 2023-08-15 RX ORDER — CLONIDINE 100 UG/ML
INJECTION, SOLUTION EPIDURAL AS NEEDED
Status: DISCONTINUED | OUTPATIENT
Start: 2023-08-15 | End: 2023-08-15 | Stop reason: SURG

## 2023-08-15 RX ORDER — ALBUTEROL SULFATE 2.5 MG/3ML
2.5 SOLUTION RESPIRATORY (INHALATION) AS NEEDED
Status: DISCONTINUED | OUTPATIENT
Start: 2023-08-15 | End: 2023-08-15

## 2023-08-15 RX ORDER — DEXAMETHASONE SODIUM PHOSPHATE 10 MG/ML
INJECTION, SOLUTION INTRAMUSCULAR; INTRAVENOUS AS NEEDED
Status: DISCONTINUED | OUTPATIENT
Start: 2023-08-15 | End: 2023-08-15 | Stop reason: SURG

## 2023-08-15 RX ORDER — MEPERIDINE HYDROCHLORIDE 25 MG/ML
12.5 INJECTION INTRAMUSCULAR; INTRAVENOUS; SUBCUTANEOUS AS NEEDED
Status: DISCONTINUED | OUTPATIENT
Start: 2023-08-15 | End: 2023-08-15

## 2023-08-15 RX ORDER — HYDROMORPHONE HYDROCHLORIDE 1 MG/ML
0.4 INJECTION, SOLUTION INTRAMUSCULAR; INTRAVENOUS; SUBCUTANEOUS EVERY 5 MIN PRN
Status: DISCONTINUED | OUTPATIENT
Start: 2023-08-15 | End: 2023-08-15

## 2023-08-15 RX ORDER — ONDANSETRON 2 MG/ML
4 INJECTION INTRAMUSCULAR; INTRAVENOUS EVERY 6 HOURS PRN
Status: DISCONTINUED | OUTPATIENT
Start: 2023-08-15 | End: 2023-08-15

## 2023-08-15 RX ORDER — SODIUM CHLORIDE, SODIUM LACTATE, POTASSIUM CHLORIDE, CALCIUM CHLORIDE 600; 310; 30; 20 MG/100ML; MG/100ML; MG/100ML; MG/100ML
INJECTION, SOLUTION INTRAVENOUS CONTINUOUS
Status: DISCONTINUED | OUTPATIENT
Start: 2023-08-15 | End: 2023-08-15

## 2023-08-15 RX ORDER — ONDANSETRON 2 MG/ML
INJECTION INTRAMUSCULAR; INTRAVENOUS AS NEEDED
Status: DISCONTINUED | OUTPATIENT
Start: 2023-08-15 | End: 2023-08-15 | Stop reason: SURG

## 2023-08-15 RX ORDER — ACETAMINOPHEN 500 MG
1000 TABLET ORAL ONCE
Status: DISCONTINUED | OUTPATIENT
Start: 2023-08-15 | End: 2023-08-15

## 2023-08-15 RX ORDER — NICOTINE POLACRILEX 4 MG
30 LOZENGE BUCCAL
Status: DISCONTINUED | OUTPATIENT
Start: 2023-08-15 | End: 2023-08-15

## 2023-08-15 RX ORDER — HYDROCODONE BITARTRATE AND ACETAMINOPHEN 10; 325 MG/1; MG/1
1 TABLET ORAL ONCE AS NEEDED
Status: DISCONTINUED | OUTPATIENT
Start: 2023-08-15 | End: 2023-08-15

## 2023-08-15 RX ORDER — HYDROMORPHONE HYDROCHLORIDE 1 MG/ML
0.6 INJECTION, SOLUTION INTRAMUSCULAR; INTRAVENOUS; SUBCUTANEOUS EVERY 5 MIN PRN
Status: DISCONTINUED | OUTPATIENT
Start: 2023-08-15 | End: 2023-08-15

## 2023-08-15 RX ORDER — DIPHENHYDRAMINE HYDROCHLORIDE 50 MG/ML
12.5 INJECTION INTRAMUSCULAR; INTRAVENOUS AS NEEDED
Status: DISCONTINUED | OUTPATIENT
Start: 2023-08-15 | End: 2023-08-15

## 2023-08-15 RX ORDER — NALOXONE HYDROCHLORIDE 0.4 MG/ML
80 INJECTION, SOLUTION INTRAMUSCULAR; INTRAVENOUS; SUBCUTANEOUS AS NEEDED
Status: DISCONTINUED | OUTPATIENT
Start: 2023-08-15 | End: 2023-08-15

## 2023-08-15 RX ORDER — TRANEXAMIC ACID 10 MG/ML
1000 INJECTION, SOLUTION INTRAVENOUS ONCE
Status: COMPLETED | OUTPATIENT
Start: 2023-08-15 | End: 2023-08-15

## 2023-08-15 RX ORDER — DEXAMETHASONE SODIUM PHOSPHATE 4 MG/ML
VIAL (ML) INJECTION AS NEEDED
Status: DISCONTINUED | OUTPATIENT
Start: 2023-08-15 | End: 2023-08-15 | Stop reason: SURG

## 2023-08-15 RX ADMIN — DEXAMETHASONE SODIUM PHOSPHATE 2 MG: 10 INJECTION, SOLUTION INTRAMUSCULAR; INTRAVENOUS at 08:54:00

## 2023-08-15 RX ADMIN — SODIUM CHLORIDE, SODIUM LACTATE, POTASSIUM CHLORIDE, CALCIUM CHLORIDE: 600; 310; 30; 20 INJECTION, SOLUTION INTRAVENOUS at 10:04:00

## 2023-08-15 RX ADMIN — CLONIDINE 20 MCG: 100 INJECTION, SOLUTION EPIDURAL at 08:54:00

## 2023-08-15 RX ADMIN — ONDANSETRON 4 MG: 2 INJECTION INTRAMUSCULAR; INTRAVENOUS at 10:00:00

## 2023-08-15 RX ADMIN — CEFAZOLIN SODIUM/WATER 2 G: 2 G/20 ML SYRINGE (ML) INTRAVENOUS at 09:00:00

## 2023-08-15 RX ADMIN — HYDRALAZINE HYDROCHLORIDE 5 MG: 20 INJECTION INTRAMUSCULAR; INTRAVENOUS at 09:38:00

## 2023-08-15 RX ADMIN — DEXAMETHASONE SODIUM PHOSPHATE 4 MG: 4 MG/ML VIAL (ML) INJECTION at 08:59:00

## 2023-08-15 RX ADMIN — TRANEXAMIC ACID 1000 MG: 10 INJECTION, SOLUTION INTRAVENOUS at 09:03:00

## 2023-08-15 RX ADMIN — SODIUM CHLORIDE, SODIUM LACTATE, POTASSIUM CHLORIDE, CALCIUM CHLORIDE: 600; 310; 30; 20 INJECTION, SOLUTION INTRAVENOUS at 08:46:00

## 2023-08-15 RX ADMIN — KETOROLAC TROMETHAMINE 15 MG: 30 INJECTION, SOLUTION INTRAMUSCULAR; INTRAVENOUS at 10:00:00

## 2023-08-15 NOTE — ANESTHESIA PROCEDURE NOTES
Regional Block    Performed by: Maria Eugenia Feldman MD  Authorized by: Maria Eugenia Feldman MD      General Information and Staff    Start Time:   Anesthesiologist:  Maria Eugenia Feldman MD  Performed by: Anesthesiologist  Patient Location:  OR    Block Placement: Pre Induction  Site Identification: real time ultrasound guided, nerve stimulator and image stored and retrievable    Block site/laterality marked before start: site marked  Reason for Block: at surgeon's request and post-op pain management    Preanesthetic Checklist: 2 patient identifers, IV checked, site marked, risks and benefits discussed, monitors and equipment checked, pre-op evaluation, timeout performed, anesthesia consent, sterile technique used, no prohibitive neurological deficits and no local skin infection at insertion site      Procedure Details    Patient Position:  Supine  Prep: ChloraPrep    Monitoring:  Heart rate, cardiac monitor, continuous pulse ox and blood pressure cuff  Block Type: Interscalene  Laterality:  Right  Injection Technique:  Single-shot    Needle    Needle Type:  Echogenic  Needle Gauge:  21 G  Needle Length:  100 mm  Needle Localization:  Nerve stimulator and ultrasound guidance  Reason for Ultrasound Use: appropriate spread of the medication was noted in real time and no ultrasound evidence of intravascular and/or intraneural injection    Nerve Stimulator: 0.8 amps    Muscle Twitch Response Comment: no response    Assessment    Injection Assessment:  Good spread noted, incremental injection, local visualized surrounding nerve on ultrasound, low pressure, negative aspiration for heme, no pain on injection and negative resistance  Paresthesia Pain:  None  Heart Rate Change: No    - Patient tolerated block procedure well without evidence of immediate block related complications.      Medications      Additional Comments    Bupivacaine 0.375% 20cc, decadron 2 mg PF, clonidine 20 mcg

## 2023-08-15 NOTE — ANESTHESIA POSTPROCEDURE EVALUATION
[facility] Loc 134 Patient Status:  Hospital Outpatient Surgery   Age/Gender 79year old male MRN [id number]   [facility] SURGERY Attending Sigifredo Lorenzo MD   Hosp Day # 0 PCP Kaylin AU       Anesthesia Post-op Note    RIGHT SHOULDER ARTHROSCOPY ROTATOR CUFF REPAIR/ SUBACROMIAL BALLOON SPACER AND EXTENSIVE DEBRIDEMENT    Procedure Summary       Date: 08/15/23 Room / Location: [address] [place] [address] OR 11 / 1404 [facility] OR    Anesthesia Start: 9514 Anesthesia Stop:     Procedure: RIGHT SHOULDER ARTHROSCOPY ROTATOR CUFF REPAIR/ SUBACROMIAL BALLOON SPACER AND EXTENSIVE DEBRIDEMENT (Right: Shoulder) Diagnosis:       Rotator cuff tear arthropathy of right shoulder      (Rotator cuff tear arthropathy of right shoulder [M75.101, M12.811])    Surgeons: Sigifredo Lorenzo MD Anesthesiologist: Tonny Siegel MD    Anesthesia Type: general, regional ASA Status: 2            Anesthesia Type: general, regional    Vitals Value Taken Time   /74 08/15/23 1016   Temp 97.6 degrees F 08/15/23 1016   Pulse 84 08/15/23 1016   Resp 16 08/15/23 1016   SpO2 92% 08/15/23 1016       Patient Location: Same Day Surgery    Anesthesia Type: general and regional    Airway Patency: patent and extubated    Postop Pain Control: adequate    Mental Status: mildly sedated but able to meaningfully participate in the post-anesthesia evaluation    Nausea/Vomiting: none    Cardiopulmonary/Hydration status: stable euvolemic    Complications: no apparent anesthesia related complications    Postop vital signs: stable    Dental Exam: Unchanged from Preop    Patient to be discharged from PACU when criteria met.

## 2023-08-15 NOTE — TELEPHONE ENCOUNTER
Gloria from [address] [place] [address] PT is requesting a call back, no protocol was received with patients referral.

## 2023-08-15 NOTE — TELEPHONE ENCOUNTER
Divya Munoz MD   to [facility] Clinical Pool       8/15/23  1:24 PM  [facility]

## 2023-08-15 NOTE — TELEPHONE ENCOUNTER
Patient's PT office called to request Dr. Jake Grossman protocol and script for this patient's Postop PT which he is starting tomorrow 8/16. Please, advise thank you. PT order was faxed to their office at [phone].

## 2023-08-15 NOTE — TELEPHONE ENCOUNTER
LOV 7/31/23  DOS surgery TODAY 8/15/23    7/31/23 PT order placed.   PT requesting protocol, further instructions

## 2023-08-15 NOTE — BRIEF OP NOTE
Pre-Operative Diagnosis: Rotator cuff tear arthropathy of right shoulder [M75.101, M12.811]     Post-Operative Diagnosis: Rotator cuff tear arthropathy of right shoulder [M75.101, M12.811]      Procedure Performed:   RIGHT SHOULDER ARTHROSCOPY ROTATOR CUFF REPAIR/ SUBACROMIAL BALLOON SPACER AND EXTENSIVE DEBRIDEMENT    Surgeon(s) and Role:     * John Sprague MD - Primary    Assistant(s):  Surgical Assistant.: Emilie Sanford     Surgical Findings: Massive retracted full-thickness rotator cuff tear with intact subscapularis. No appreciable excursion. Mild osteoarthritis with degenerative changes of the glenoid as well as the humeral head successfully managed with subacromial balloon spacer placement as well as extensive debridement of glenoid labrum, rotator cuff, subacromial bursa, humeral bone, humeral cartilage. Adequate replacement and excellent fit with medium sized subacromial balloon spacer.      Specimen: None     Estimated Blood Loss: Blood Output: 5 mL (8/15/2023  9:55 AM)      Dictation Number:  Rohit Mills MD  8/15/2023  10:46 AM

## 2023-08-16 ENCOUNTER — TELEPHONE (OUTPATIENT)
Dept: ORTHOPEDICS CLINIC | Facility: CLINIC | Age: 83
End: 2023-08-16

## 2023-08-16 NOTE — OPERATIVE REPORT
[address] OPERATIVE RECORD  ATTENDING SURGEON: Milagro Olivares MD  ASSISTANT(S): Karsten Hawkins  STATEMENT OF MEDICAL NECESSITY  The aid of assistant Karsten Hawkins was needed for patient positioning, preparation, drape, retraction,  wound closure, dressing application and critical portions of the procedure. PRELIMINARY DIAGNOSIS:  1. Right Shoulder Massive Retracted Full Thickness Rotator Cuff Tear  2. Right Shoulder Osteoarthritis  3. Right Shoulder Subacromial Impingement  4. Right Shoulder Labral Tear    POSTOPERATIVE DIAGNOSIS:  1. Right Shoulder Massive Retracted Full Thickness Rotator Cuff Tear  2. Right Shoulder Osteoarthritis  3. Right Shoulder Subacromial Impingement  4. Right Shoulder Labral Tear    THE OPERATION:  1. Right Shoulder Implantation of Subacromial Balloon Spacer ()  2. Right Shoulder Arthroscopic Extensive Debridement (Glenoid Labrum, Humeral Bone, Subacromial Bursa, Articular Capsule, Rotator Cuff) (72945)    ANESTHESIA: General Endotracheal with Regional Block   ANESTHESIOLOGIST: Yadira Dorman MD  ANTIBIOTICS: Cefazolin 2g within 60 minutes of surgical incision. IMPLANTS:   Implant Name Type Inv. Item Serial No.  Lot No. LRB No. Used Action   InSpace System Ortho space   N/A  [employer] DIV STRY [id number] Right 1 Implanted     PATHOLOGY/CULTURES: None  ESTIMATED BLOOD LOSS: Blood Output: 5 mL (8/15/2023  2:73 AM)    COMPLICATIONS: No intraoperative nor immediate postoperative complications noted. COUNTS: All sponge and needle counts were correct at the conclusion of the procedure. OPERATIVE FINDINGS:  Massive retracted full-thickness rotator cuff tear with intact subscapularis. No appreciable excursion.  Mild osteoarthritis with degenerative changes of the glenoid as well as the humeral head successfully managed with subacromial balloon spacer placement as well as extensive debridement of glenoid labrum, rotator cuff, subacromial bursa, humeral bone, humeral cartilage. Adequate replacement and excellent fit with medium sized subacromial balloon spacer. Indications:  Osman Justice is a 79year old Right hand dominant male with continued right shoulder pain in the setting of advanced rotator cuff tear arthropathy. Previous steroid injection at last visit on 5/08/23 and physical therapy have given him good improvement but he is still lacking function. Treatment is amenable to surgical intervention in the form of subacromial balloon arthroplasty / spacer coupled with subacromial decompression and distal clavicle excision. Operative and nonoperative options were discussed with him in my office and we ultimately agreed that surgery would provide the highest likelihood of symptomatic relief and functional improvement. The risks and benefits of surgery as well as the postoperative rehabilitation plan were reviewed. He voiced a good understanding of treatment options, risks and benefits, and alternatives to surgery. He was given the opportunity to ask questions, which were all answered to the best of my ability and to his satisfaction. Shaun Macias wished to proceed. On the day of surgery, the Shaun Macias was seen in the preoperative area. I confirmed his identity by name and birthday. We reviewed and confirmed the surgical consent including laterality. The surgical site was marked with my initials. We re-reviewed the risks and benefits of the procedure and I answered all additional questions to his satisfaction. OPERATIVE REPORT:   Shaun Macias was taken to the operating room in stable condition. The patient was positioned in the modified beach chair position utilizing the special attachment for the [facility] bed allowing for the ipsilateral arm breakaway. Trimano arm domínguez was utilized and the head was secured appropriately.  The patient underwent sterile pre-prep with chlorhexidine scrub brushes and alcohol followed by chlorhexidine preparation. A surgical time out was performed where I confirmed laterality as marked by my initials, reaffirmed the consent, noted appropriate imaging studies were in the room, and that preoperative antibiotics had been given within recommended timeframe prior to skin incision. Examination under anesthesia demonstrated full shoulder range of motion, stable to anterior, posterior and inferior stresses. A 15 blade was used to make the skin incision standard posterior portal, the 4 mm arthroscope was introduced into the glenohumeral joint. A standard anterior portal was established within the rotator internal and a probe was introduced into the glenohumeral joint. Diagnostic arthroscopy revealed:    Biceps [place] No evidence of Biceps tendon within [facility] joint   Superior labrum Abnormal with type 1 SLAP tearing   Anterior labrum Intact with mild fraying   Inferior labrum Intact with moderate fraying   Posterior labrum Intact with moderate fraying   Glenoid cartilage Grade 2-3 changes   Humeral cartilage Grade 3 changes   Rotator Interval Moderate inflammation, adequately debrided. Subscapularis Intact with mild partial thickness tearing. Biceps tendon Absent   Supraspinatus Full thickness retracted tear   Infraspinatus Full thickness retracted tear   Hill-Sachs Lesion None     Upon completion of the diagnostic arthroscopy, the abnormal fraying of the labrum and articular capsule at the insertion of the anterior portal was debrided with a 4.0 mm Tomcat shaver via the anterior portal.    Using spinal needle localization, and anterolateral portal within the rotator interval was established. I began with utilizing 4.0 mm shaver to debride the abnormal pathologic glenoid labrum to a stable margin as well as the inflammatory bursal tissue within the subacromial space. Glenoid labrum, glenoid bone and articular capsule were debrided to a stable margin.   At this time it was deemed appropriate to proceed with subacromial balloon spacer arthroplasty as such a lateral based incision was made under spinal needle localization and the [place] balloon construct was implemented. This involved 1 cm of overhang and a medium sized balloon was appropriate. This was inflated fully and then deflated approximately 8 cc and confirmed to be in appropriate position and thus procedure was concluded by formal placement of the subacromial balloon spacer. A towel was used to extract fluid from the arthroscopic incisions and closure was performed using buried interrupted 2-0 monocryl, 3-0 Monocryl, Dermabond, Steri-Strips, gauze, and Tegaderm dressing. The patient was placed into a shoulder immobilizer. Sofia Ravi was taken to the recovery room in a stable condition with plan for ambulatory discharge to home. He was provided my postoperative discharge booklet, appropriate home exercises, script for outpatient physical therapy, and a copy of my rehabilitation guidelines. POST OPERATIVE PLAN:  Activity Precautions: Passive ROMAT, 1 lb WB x 4 weeks. Begin PT POD #1. Sling for 1-2 weeks. DVT Prophylaxis: Not indicated  Follow-up Visit: 1-2 weeks. Juan J Malhotra. Mahsa Hoyt MD  Knee, Shoulder, & Elbow Surgery / Sports Medicine Specialist  [facility] Orthopaedic Surgery  [address]   [place]. [email or web address]  t: [phone]  o: [phone]  f: [phone]    This note was dictated using Dragon software. While it was briefly proofread prior to completion, some grammatical, spelling, and word choice errors due to dictation may still occur.

## 2023-08-16 NOTE — TELEPHONE ENCOUNTER
DOS 8/15/23 right should arthroscopy    Right arm nerve block is still in full effect. - patient can move fingers and wrist but it does not have full feeling in arm at this time. - Should he start PT today?

## 2023-08-16 NOTE — TELEPHONE ENCOUNTER
Jared Godoy MD   to [facility] Clinical [facility]       8/16/23  9:45 AM   Yes, he may start PT today. Thanks! We prefer starting passive motion when there is no pain.

## 2023-08-16 NOTE — TELEPHONE ENCOUNTER
Called patient and wife no answer at this time. Called Gloria at PT and notified them that the  Does want patient to go to PT today. Patients wife previously notified that if she does not hear back from the office they should attend PT as scheduled.

## 2023-08-21 ENCOUNTER — OFFICE VISIT (OUTPATIENT)
Dept: ORTHOPEDICS CLINIC | Facility: CLINIC | Age: 83
End: 2023-08-21
Payer: MEDICARE

## 2023-08-21 DIAGNOSIS — Z98.890 S/P ARTHROSCOPY OF SHOULDER: Primary | ICD-10-CM

## 2023-09-18 ENCOUNTER — OFFICE VISIT (OUTPATIENT)
Dept: ORTHOPEDICS CLINIC | Facility: CLINIC | Age: 83
End: 2023-09-18
Payer: MEDICARE

## 2023-09-18 DIAGNOSIS — Z98.890 S/P ARTHROSCOPY OF SHOULDER: Primary | ICD-10-CM

## 2023-09-18 PROCEDURE — 99024 POSTOP FOLLOW-UP VISIT: CPT | Performed by: PHYSICIAN ASSISTANT

## 2023-09-29 ENCOUNTER — MED REC SCAN ONLY (OUTPATIENT)
Dept: ORTHOPEDICS CLINIC | Facility: CLINIC | Age: 83
End: 2023-09-29

## 2023-10-23 ENCOUNTER — OFFICE VISIT (OUTPATIENT)
Dept: ORTHOPEDICS CLINIC | Facility: CLINIC | Age: 83
End: 2023-10-23
Payer: MEDICARE

## 2023-10-23 DIAGNOSIS — Z98.890 S/P ARTHROSCOPY OF SHOULDER: Primary | ICD-10-CM

## 2023-10-23 RX ORDER — GLIPIZIDE 5 MG/1
0.5 TABLET ORAL
COMMUNITY
Start: 2023-09-21

## 2023-10-26 ENCOUNTER — MED REC SCAN ONLY (OUTPATIENT)
Dept: ORTHOPEDICS CLINIC | Facility: CLINIC | Age: 83
End: 2023-10-26

## 2023-11-10 ENCOUNTER — OFFICE VISIT (OUTPATIENT)
Dept: ORTHOPEDICS CLINIC | Facility: CLINIC | Age: 83
End: 2023-11-10
Payer: MEDICARE

## 2023-11-10 DIAGNOSIS — Z98.890 S/P ARTHROSCOPY OF SHOULDER: Primary | ICD-10-CM

## 2023-11-10 PROCEDURE — 99213 OFFICE O/P EST LOW 20 MIN: CPT | Performed by: ORTHOPAEDIC SURGERY

## 2023-11-10 RX ORDER — SEMAGLUTIDE 2.68 MG/ML
INJECTION, SOLUTION SUBCUTANEOUS
COMMUNITY
Start: 2023-11-08

## 2023-12-01 ENCOUNTER — MED REC SCAN ONLY (OUTPATIENT)
Dept: ORTHOPEDICS CLINIC | Facility: CLINIC | Age: 83
End: 2023-12-01

## 2024-06-08 ENCOUNTER — HOSPITAL ENCOUNTER (OUTPATIENT)
Age: 84
Discharge: HOME | End: 2024-06-08
Payer: MEDICARE

## 2024-06-08 VITALS
HEART RATE: 76 BPM | WEIGHT: 215 LBS | RESPIRATION RATE: 22 BRPM | OXYGEN SATURATION: 93 % | DIASTOLIC BLOOD PRESSURE: 81 MMHG | TEMPERATURE: 98 F | HEIGHT: 70 IN | SYSTOLIC BLOOD PRESSURE: 133 MMHG | BODY MASS INDEX: 30.78 KG/M2

## 2024-06-08 DIAGNOSIS — S51.812A SKIN TEAR OF LEFT FOREARM WITHOUT COMPLICATION, INITIAL ENCOUNTER: Primary | ICD-10-CM

## 2024-06-08 PROCEDURE — 99213 OFFICE O/P EST LOW 20 MIN: CPT

## 2024-06-08 NOTE — ED PROVIDER NOTES
History     Chief Complaint   Patient presents with    Laceration/Abrasion       Subjective:   HPI    Vernon Heart, 83 year old male with notable medical history of DM who presents with skin tear.  Patient reports accidentally causing a skin tear to his Left forearm a couple days ago and does not have any remaining Mupirocin ointment at home. Patient and wife report patient bumps into things often and has skin tears often. Denies other injuries or complaints.    UTD with tetanus        Objective:   Past Medical History:    Anesthesia complication    confused after anesthesia    Cataract    bilateral removal    Diabetes (HCC)    Glaucoma    Hearing impairment    bilateral hearing aids    High cholesterol    History of 2019 novel coronavirus disease (COVID-19)    not hospitalized; fatigue, fever, cough; still having occasional cough no more fever or fatigue    Osteoarthritis    Pneumonia    Sleep apnea    waiting for cpap tx since 6/10/23    Visual impairment    glasses for reading              Past Surgical History:   Procedure Laterality Date    Colonoscopy      Hip replacement surgery      Other      parotid sx    Removal gallbladder      Total hip replacement                  Social History     Socioeconomic History    Marital status:    Tobacco Use    Smoking status: Former     Types: Cigarettes    Smokeless tobacco: Never    Tobacco comments:     Quit 40 yrs ago   Vaping Use    Vaping status: Never Used   Substance and Sexual Activity    Alcohol use: Not Currently    Drug use: Not Currently              No current facility-administered medications on file prior to encounter.     Current Outpatient Medications on File Prior to Encounter   Medication Sig Dispense Refill    OZEMPIC, 2 MG/DOSE, 8 MG/3ML Subcutaneous Solution Pen-injector INJECT 2 MG SUBCUTANEOUSLY ONCE A WEEK      glipiZIDE 5 MG Oral Tab Take 0.5 tablets (2.5 mg total) by mouth before breakfast.      latanoprost 0.005 %  Ophthalmic Solution INSTILL 1 DROP INTO LEFT EYE ONCE DAILY AT NIGHT AT BEDTIME      doxepin 25 MG Oral Cap Take 1 capsule (25 mg total) by mouth nightly.      timolol 0.5 % Ophthalmic Solution 1 drop 2 (two) times daily.      Blood Glucose Monitoring Suppl (ONETOUCH ULTRA 2) w/Device Does not apply Kit 1 strip by In Vitro route 2 (two) times daily.      B Complex-C-Folic Acid Oral Tab Take by mouth.      ACTOS 30 MG Oral Tab Take 1 tablet (30 mg total) by mouth daily.      Cholecalciferol (VITAMIN D-3 OR) Take 1 tablet by mouth 3 (three) times daily.      atorvastatin 40 MG Oral Tab Take 1 tablet (40 mg total) by mouth daily.      metFORMIN HCl 500 MG Oral Tab Take 2 tablets (1,000 mg total) by mouth 2 (two) times daily with meals.      Empagliflozin 25 MG Oral Tab Take 25 mg by mouth daily.           Review of Systems   Skin:  Positive for wound.   All other systems reviewed and are negative.        Constitutional and vital signs reviewed.      All other systems reviewed and negative except as noted above.    I have reviewed the family history, social history, allergies, and outpatient medications.     History reviewed from EMR: Encounters, problem list, allergies, medications      Physical Exam     ED Triage Vitals [06/08/24 1201]   /81   Pulse 76   Resp 22   Temp 97.8 °F (36.6 °C)   Temp src Temporal   SpO2 93 %   O2 Device None (Room air)       Current:/81   Pulse 76   Temp 97.8 °F (36.6 °C) (Temporal)   Resp 22   Ht 177.8 cm (5' 10\")   Wt 97.5 kg   SpO2 93%   BMI 30.85 kg/m²       Physical Exam  Vitals and nursing note reviewed.   Constitutional:       General: He is not in acute distress.     Appearance: Normal appearance. He is normal weight. He is not ill-appearing or toxic-appearing.   HENT:      Head: Normocephalic and atraumatic.      Right Ear: External ear normal.      Left Ear: External ear normal.      Nose: Nose normal. No congestion or rhinorrhea.      Mouth/Throat:      Mouth:  Mucous membranes are moist.   Eyes:      Extraocular Movements: Extraocular movements intact.      Conjunctiva/sclera: Conjunctivae normal.      Pupils: Pupils are equal, round, and reactive to light.   Cardiovascular:      Rate and Rhythm: Normal rate.      Pulses: Normal pulses.   Pulmonary:      Effort: Pulmonary effort is normal. No respiratory distress.   Musculoskeletal:         General: No swelling, tenderness or signs of injury. Normal range of motion.      Cervical back: Normal range of motion.   Skin:     General: Skin is warm and dry.      Capillary Refill: Capillary refill takes less than 2 seconds.      Comments: Approx 2x2cm trapezoidal skin tear to Left dorsal forearm. No signs of infection. CMS intact throughout LUE.   Neurological:      General: No focal deficit present.      Mental Status: He is alert and oriented to person, place, and time. Mental status is at baseline.   Psychiatric:         Mood and Affect: Mood normal.         Behavior: Behavior normal.         Thought Content: Thought content normal.         Judgment: Judgment normal.            ED Course     Labs Reviewed - No data to display  No orders to display       Vitals:    06/08/24 1201   BP: 133/81   Pulse: 76   Resp: 22   Temp: 97.8 °F (36.6 °C)   TempSrc: Temporal   SpO2: 93%   Weight: 97.5 kg   Height: 177.8 cm (5' 10\")            [facility]        Vernon Heart, 83 year old male with medical history as noted above who presents with skin tear   - Patient in NAD, VSS   - Notable skin tear to Left forearm   - retracted skin removed after loosening with saline   - Bacitracin and non-adherent bandage placed   - Additional wound care supplies provided   - Wound care discussed   - f/u with primary care provider as needed       ** Concerning co-morbidities possibly affecting complaint / care: n/a     ** See ED course below for additional information on care provided / interventions / notable events throughout patient's encounter.          ** I have independently reviewed the radiology images, clinical lab results, and ECG tracings as described above (if applicable)    ** See below for home care instructions (if applicable)        Medical Decision Making  Risk  Prescription drug management.        Disposition and Plan     Clinical Impression:  1. Skin tear of left forearm without complication, initial encounter         Disposition:  Discharge  6/8/2024 12:26 pm    Follow-up:  No follow-up provider specified.        Medications Prescribed:  Discharge Medication List as of 6/8/2024 12:32 PM        START taking these medications    Details   mupirocin 2 % External Ointment Apply 1 Application topically daily for 7 days., Normal, Disp-1 g, R-0             The above patient (and/or guardian) was made aware that an appropriate evaluation has been performed, and that no additional testing is required at this time. In my medical judgment, there is currently no evidence of an immediate life-threatening or surgical condition, therefore discharge is indicated at this time. The patient (and/or guardian) was advised that a small risk still exists that a serious condition could develop. The patient was instructed to arrange close follow-up with their primary care provider (or the referral provider given today). The patient received written and verbal instructions regarding their condition / concerns, demonstrated understanding, and is agreement with the outpatient treatment plan.        Home care instructions:     - Clean and dry gently   - Apply antibiotic ointment and non-adherent bandage daily   - Monitor for signs of infection as discussed (e.g. pain, redness, swelling, drainage, etc.)   - Follow up with your primary care provider as needed      Jean-Pierre Zepeda, DNP, APRN, AGACNP-BC, FNP-C, CNL  Adult-Gerontology Acute Care & Family Nurse Practitioner  [facility]

## 2024-06-08 NOTE — DISCHARGE INSTRUCTIONS
- Clean and dry gently   - Apply antibiotic ointment and non-adherent bandage daily   - Monitor for signs of infection as discussed (e.g. pain, redness, swelling, drainage, etc.)   - Follow up with your primary care provider as needed

## 2024-08-20 ENCOUNTER — HOSPITAL ENCOUNTER (OUTPATIENT)
Age: 84
Discharge: HOME | End: 2024-08-20
Payer: MEDICARE

## 2024-08-20 VITALS
HEIGHT: 70 IN | WEIGHT: 215 LBS | BODY MASS INDEX: 30.78 KG/M2 | HEART RATE: 89 BPM | RESPIRATION RATE: 20 BRPM | SYSTOLIC BLOOD PRESSURE: 142 MMHG | TEMPERATURE: 99 F | DIASTOLIC BLOOD PRESSURE: 72 MMHG | OXYGEN SATURATION: 95 %

## 2024-08-20 DIAGNOSIS — U07.1 COVID-19: Primary | ICD-10-CM

## 2024-08-20 LAB — SARS-COV-2 RNA RESP QL NAA+PROBE: DETECTED

## 2024-08-20 PROCEDURE — 99212 OFFICE O/P EST SF 10 MIN: CPT

## 2024-08-20 PROCEDURE — 99213 OFFICE O/P EST LOW 20 MIN: CPT

## 2024-08-20 NOTE — DISCHARGE INSTRUCTIONS
Follow-up with your primary care provider for all of your healthcare needs  Increase fluids keep well-hydrated  Over-the-counter Coricidin highly recommended for your cough  Tylenol for pain and fever  Increase vitamin C  Return to the emergency room for symptoms or concerns

## 2024-08-20 NOTE — ED INITIAL ASSESSMENT (HPI)
Cough started today. Spouse tested +covid Saturday from a home test but was .  Per spouse  pt is concerned  as he has upcoming tests tomorrow. Denies fever

## (undated) DEVICE — SOLUTION  .9 3000ML

## (undated) DEVICE — SUTURE MONOCRYL 0 CT-1

## (undated) DEVICE — LIGHT HANDLE

## (undated) DEVICE — 3M™ IOBAN™ 2 ANTIMICROBIAL INCISE DRAPE 6651EZ: Brand: IOBAN™ 2

## (undated) DEVICE — CAUTERY NEEDLE 2IN INS E1465

## (undated) DEVICE — DRAPE,U/SHT,SPLIT,FILM,60X84,STERILE: Brand: MEDLINE

## (undated) DEVICE — STERILE SYNTHETIC POLYISOPRENE POWDER-FREE SURGICAL GLOVES WITH HYDROGEL COATING, SMOOTH FINISH, STRAIGHT FINGER: Brand: PROTEXIS

## (undated) DEVICE — SUTURE NABSB OTHCRD 2 OS-6

## (undated) DEVICE — SUT MONOCRYL 2-0 SH Y417H

## (undated) DEVICE — 3M™ STERI-STRIP™ REINFORCED ADHESIVE SKIN CLOSURES, R1547, 1/2 IN X 4 IN (12 MM X 100 MM), 6 STRIPS/ENVELOPE: Brand: 3M™ STERI-STRIP™

## (undated) DEVICE — SOLUTION  .9 1000ML BTL

## (undated) DEVICE — SUTURE MONOCRYL 3-0 PS-2

## (undated) DEVICE — COVER,MAYO STAND,STERILE: Brand: MEDLINE

## (undated) DEVICE — STERILE POLYISOPRENE POWDER-FREE SURGICAL GLOVES: Brand: PROTEXIS

## (undated) DEVICE — SUTURE MONOCRYL 2-0 SH

## (undated) DEVICE — SUTURE FIBERWIRE 5 AR-7211

## (undated) DEVICE — [TOMCAT CUTTER, ARTHROSCOPIC SHAVER BLADE,  DO NOT RESTERILIZE,  DO NOT USE IF PACKAGE IS DAMAGED,  KEEP DRY,  KEEP AWAY FROM SUNLIGHT]: Brand: FORMULA

## (undated) DEVICE — EXOFIN TISSUE ADHESIVE 1.0ML

## (undated) DEVICE — PIN FX 9IN STNM CMPRH RVRS SYS

## (undated) DEVICE — SYRINGE 50ML LL TIP

## (undated) DEVICE — KIT TRC TRIMANO BEACH CHR ARM

## (undated) DEVICE — VIOLET BRAIDED (POLYGLACTIN 910), SYNTHETIC ABSORBABLE SUTURE: Brand: COATED VICRYL

## (undated) DEVICE — ALCOHOL 70% 4 OZ

## (undated) DEVICE — 3M™ TEGADERM™ +PAD FILM DRESSING WITH NON-ADHERENT PAD, 3584, 2-3/8 IN X 4 IN (6 CM X 10 CM), 50/CAR, 4 CAR/CS: Brand: 3M™ TEGADERM™

## (undated) DEVICE — GAUZE SPONGES,12 PLY: Brand: CURITY

## (undated) DEVICE — GOWN,SIRUS,FABRIC-REINFORCED,X-LARGE: Brand: MEDLINE

## (undated) DEVICE — DRAPE SURG 12X18

## (undated) DEVICE — TUBING DW OUTFLOW

## (undated) DEVICE — INTENDED TO AID IN THE PASSING OF SUTURES THROUGH BONE AND SOFT TISSUE DURING ORTHOPEDIC SURGERY: Brand: HOFFEE SUTURE RETRIEVER

## (undated) DEVICE — DISPOSABLE TOURNIQUET CUFF SINGLE BLADDER, DUAL PORT AND QUICK CONNECT CONNECTOR: Brand: COLOR CUFF

## (undated) DEVICE — #15 STERILE STAINLESS BLADE: Brand: STERILE STAINLESS BLADES

## (undated) DEVICE — HOOD: Brand: FLYTE

## (undated) DEVICE — GAUZE TRAY STERILE 4X4 12PLY

## (undated) DEVICE — UPPER EXTREMITY CDS-LF: Brand: MEDLINE INDUSTRIES, INC.

## (undated) DEVICE — BIT DRL 2.7MM PERI SCR

## (undated) DEVICE — SCD SLEEVE KNEE HI BLEND

## (undated) DEVICE — STERILE POLYISOPRENE POWDER-FREE SURGICAL GLOVES WITH EMOLLIENT COATING: Brand: PROTEXIS

## (undated) DEVICE — SUPER TURBOVAC 90 IFS: Brand: COBLATION

## (undated) DEVICE — PADDING CAST COTTON STER 3

## (undated) DEVICE — 40765 BEACH CHAIR HEAD RESTRAINT: Brand: 40765 BEACH CHAIR HEAD RESTRAINT

## (undated) DEVICE — Device

## (undated) DEVICE — SUT ETHILON 4-0 PS-2 1667H

## (undated) DEVICE — HOOD, PEEL-AWAY: Brand: FLYTE

## (undated) DEVICE — 3M™ IOBAN™ 2 ANTIMICROBIAL INCISE DRAPE 6648EZ: Brand: IOBAN™ 2

## (undated) DEVICE — ISOPROPYL ALCOHOL 70% 4OZ BTL

## (undated) DEVICE — BIT DRL 3.2MM CMPRH CNTR SCR

## (undated) DEVICE — SUT MONOCRYL 3-0 PS-2 Y427H

## (undated) DEVICE — SHOULDER ARTHROSCOPY CDS-LF: Brand: MEDLINE INDUSTRIES, INC.

## (undated) DEVICE — 1010 S-DRAPE TOWEL DRAPE 10/BX: Brand: STERI-DRAPE™

## (undated) DEVICE — COVER LIGHT HANDLE RIGID GREEN

## (undated) DEVICE — 1016 S-DRAPE IRRIG POUCH 10/BOX: Brand: STERI-DRAPE™

## (undated) DEVICE — SOL  .9 1000ML BTL

## (undated) DEVICE — STOCK SURG LG 48X9IN

## (undated) DEVICE — PIN FX 2.5IN 1/8IN STNM SS

## (undated) DEVICE — SLINGSHOT 2 L

## (undated) DEVICE — ORTHO CDS-LF: Brand: MEDLINE INDUSTRIES, INC.

## (undated) DEVICE — SHEET,DRAPE,70X100,STERILE: Brand: MEDLINE

## (undated) DEVICE — NON-ADHERENT STRIPS,OIL EMULSION: Brand: CURITY

## (undated) DEVICE — TUBING IRR 16FT CNT WV 3 ASCP

## (undated) DEVICE — PAD SACRAL PREMIUM 12X12X1

## (undated) DEVICE — SLEEVE KENDALL SCD EXPRESS MED

## (undated) DEVICE — MINI-BLADE®: Brand: BEAVER®

## (undated) DEVICE — Device: Brand: STABLECUT®

## (undated) DEVICE — DERMABOND CLOSURE 0.7ML TOPICL

## (undated) DEVICE — MEDI-VAC NON-CONDUCTIVE SUCTION TUBING: Brand: CARDINAL HEALTH

## (undated) DEVICE — [RESECTOR CUTTER, ARTHROSCOPIC SHAVER BLADE,  DO NOT RESTERILIZE,  DO NOT USE IF PACKAGE IS DAMAGED,  KEEP DRY,  KEEP AWAY FROM SUNLIGHT]: Brand: FORMULA

## (undated) DEVICE — DISPOSABLE BIPOLAR FORCEPS 4" (10.2CM) JEWELERS, STRAIGHT 0.4MM TIP AND 12 FT. (3.6M) CABLE: Brand: KIRWAN

## (undated) NOTE — Clinical Note
Dear Dr. Bernardo Florentino,  Thank you for the referral and allowing me to participate in the care of [facility] [place] Timothy Rangel. Please call me with any questions at [phone]    Ethan Fernandes MD, [address] [place] [address]  Physical Medicine and Rehabilitation/Sports Medicine  [facility]

## (undated) NOTE — LETTER
21  [facility]  Pre-Operative Clearance Request    Patient Name:   Debora Mas             :   1940    Surgeon: Dr. Angela Gomez                    Date of Surgery: 21     Surgical Procedure: Left Revers

## (undated) NOTE — LETTER
[facility] Testing Department  Phone: [phone]  Right Fax: [phone]    ADDRESSEE INFORMATION: SENDER INFORMATION:   To:   Wilfrido Roth MD From: Rawleigh Boeck RN     Department: Pre-Admission Testing   Fax Number: [phone] Date: 10/7/2022     Phone Number:  Phone Number: [phone]   Re: Patient Name: Vidal Roberts  CSN: [id number]  Medical Record: [id number]   : 1940 - A: 79 y    Sex: male Fax Number: [phone]     Number of Pages (Including Cover Sheet) 1     The above patient had a positive COVID test on: 2022      Per [facility] Infection Control guidelines this patient will NOT be retested for COVID  prior to their surgery/procedure on: 10/12/2022. Thank you. This message is intended only for the use of the individual or entity to which it is addressed. It may have been disclosed to you from records whose confidentiality is protected by [employer] and applicable state law. Federal Regulation, [address] part 164, prohibits you from making any further disclosure without specific authorization of the person to whom it pertains, or as otherwise permitted by such regulations. If the reader of this message is not the intended recipient, you are hereby notified that reading, disseminating, distributing or copying this communication is strictly prohibited. If you have received this communication in error, please immediately notify us by telephone and return the original message to us at [address] [facility], [facility]  via the [email or web address].   [address]

## (undated) NOTE — LETTER
23    [facility] Orthopedic Surgery   Pre-Operative Clearance Request    Patient Name:   Carlee Gonzalez             :   1940    Surgeon: Dr. Edu Nick             Date of Surgery: 8/15/2023    Surgical Procedure: RIGHT SHOULDER ARTHROSCOPY ROTATOR CUFF REPAIR/ SUBACROMIAL BALLOON SPACER, EXTENSIVE DEBRIDEMENT       Please complete all of the following 2-3 weeks PRIOR TO your scheduled surgery to avoid potential cancellation. [x]  History and Physical        [x]  Medical  Clearance                                                                                                    **Please fax test results, H&P, and clearance to [phone] and to [facility] at [phone]**